# Patient Record
Sex: FEMALE | Race: WHITE | NOT HISPANIC OR LATINO | Employment: UNEMPLOYED | URBAN - METROPOLITAN AREA
[De-identification: names, ages, dates, MRNs, and addresses within clinical notes are randomized per-mention and may not be internally consistent; named-entity substitution may affect disease eponyms.]

---

## 2017-10-16 ENCOUNTER — TRANSCRIBE ORDERS (OUTPATIENT)
Dept: ADMINISTRATIVE | Facility: HOSPITAL | Age: 42
End: 2017-10-16

## 2017-10-16 DIAGNOSIS — Z12.31 VISIT FOR SCREENING MAMMOGRAM: Primary | ICD-10-CM

## 2017-10-18 ENCOUNTER — HOSPITAL ENCOUNTER (OUTPATIENT)
Dept: MAMMOGRAPHY | Facility: HOSPITAL | Age: 42
Discharge: HOME | End: 2017-10-18
Attending: OBSTETRICS & GYNECOLOGY | Admitting: OBSTETRICS & GYNECOLOGY

## 2017-10-18 ENCOUNTER — APPOINTMENT (OUTPATIENT)
Dept: OTHER | Facility: HOSPITAL | Age: 42
End: 2017-10-18
Attending: OBSTETRICS & GYNECOLOGY

## 2017-10-18 DIAGNOSIS — Z12.31 VISIT FOR SCREENING MAMMOGRAM: ICD-10-CM

## 2017-10-18 DIAGNOSIS — Z92.89 H/O MAMMOGRAM: ICD-10-CM

## 2017-10-18 PROCEDURE — 77063 BREAST TOMOSYNTHESIS BI: CPT

## 2017-10-18 PROCEDURE — G0202 SCR MAMMO BI INCL CAD: HCPCS

## 2017-10-24 PROCEDURE — 77063 BREAST TOMOSYNTHESIS BI: CPT | Performed by: RADIOLOGY

## 2017-10-24 PROCEDURE — 77067 SCR MAMMO BI INCL CAD: CPT | Performed by: RADIOLOGY

## 2017-11-03 ENCOUNTER — TRANSCRIBE ORDERS (OUTPATIENT)
Dept: ADMINISTRATIVE | Facility: HOSPITAL | Age: 42
End: 2017-11-03

## 2017-11-03 DIAGNOSIS — N63.0 LUMP OR MASS IN BREAST: Primary | ICD-10-CM

## 2017-11-09 ENCOUNTER — HOSPITAL ENCOUNTER (OUTPATIENT)
Dept: ULTRASOUND IMAGING | Facility: HOSPITAL | Age: 42
Discharge: HOME | End: 2017-11-09
Attending: OBSTETRICS & GYNECOLOGY | Admitting: OBSTETRICS & GYNECOLOGY

## 2017-11-09 DIAGNOSIS — N63.0 LUMP OR MASS IN BREAST: ICD-10-CM

## 2017-11-09 PROCEDURE — 76642 ULTRASOUND BREAST LIMITED: CPT

## 2017-11-09 PROCEDURE — 76642 ULTRASOUND BREAST LIMITED: CPT | Performed by: RADIOLOGY

## 2018-07-02 ENCOUNTER — CONSULT (OUTPATIENT)
Dept: ONCOLOGY | Facility: CLINIC | Age: 43
End: 2018-07-02

## 2018-07-02 ENCOUNTER — LAB (OUTPATIENT)
Dept: LAB | Facility: HOSPITAL | Age: 43
End: 2018-07-02

## 2018-07-02 VITALS
BODY MASS INDEX: 24.44 KG/M2 | WEIGHT: 165 LBS | RESPIRATION RATE: 13 BRPM | HEART RATE: 84 BPM | TEMPERATURE: 98.7 F | SYSTOLIC BLOOD PRESSURE: 133 MMHG | HEIGHT: 69 IN | DIASTOLIC BLOOD PRESSURE: 80 MMHG

## 2018-07-02 DIAGNOSIS — D68.00 VON WILLEBRAND DISEASE (HCC): Primary | ICD-10-CM

## 2018-07-02 DIAGNOSIS — D68.00 VON WILLEBRAND DISEASE (HCC): ICD-10-CM

## 2018-07-02 LAB
ALBUMIN SERPL-MCNC: 4.23 G/DL (ref 3.2–4.8)
ALBUMIN/GLOB SERPL: 1.2 G/DL (ref 1.5–2.5)
ALP SERPL-CCNC: 76 U/L (ref 25–100)
ALT SERPL W P-5'-P-CCNC: 12 U/L (ref 7–40)
ANION GAP SERPL CALCULATED.3IONS-SCNC: 7 MMOL/L (ref 3–11)
APTT PPP: 35.3 SECONDS (ref 24–31)
AST SERPL-CCNC: 17 U/L (ref 0–33)
BILIRUB SERPL-MCNC: 0.5 MG/DL (ref 0.3–1.2)
BUN BLD-MCNC: 18 MG/DL (ref 9–23)
BUN/CREAT SERPL: 23.1 (ref 7–25)
CALCIUM SPEC-SCNC: 8.9 MG/DL (ref 8.7–10.4)
CHLORIDE SERPL-SCNC: 104 MMOL/L (ref 99–109)
CLOSURE TME COLL+ADP BLD: 110 SECONDS (ref 54–123)
CLOSURE TME COLL+EPINEP BLD: 159 SECONDS (ref 72–192)
CO2 SERPL-SCNC: 27 MMOL/L (ref 20–31)
CREAT BLD-MCNC: 0.78 MG/DL (ref 0.6–1.3)
ERYTHROCYTE [DISTWIDTH] IN BLOOD BY AUTOMATED COUNT: 13.8 % (ref 11.3–14.5)
GFR SERPL CREATININE-BSD FRML MDRD: 81 ML/MIN/1.73
GLOBULIN UR ELPH-MCNC: 3.6 GM/DL
GLUCOSE BLD-MCNC: 107 MG/DL (ref 70–100)
HCT VFR BLD AUTO: 40.3 % (ref 34.5–44)
HGB BLD-MCNC: 12.8 G/DL (ref 11.5–15.5)
INR PPP: 1.02 (ref 0.91–1.09)
LYMPHOCYTES # BLD AUTO: 1.6 10*3/MM3 (ref 0.6–4.8)
LYMPHOCYTES NFR BLD AUTO: 22.6 % (ref 24–44)
MCH RBC QN AUTO: 28.7 PG (ref 27–31)
MCHC RBC AUTO-ENTMCNC: 31.9 G/DL (ref 32–36)
MCV RBC AUTO: 90 FL (ref 80–99)
MONOCYTES # BLD AUTO: 0.6 10*3/MM3 (ref 0–1)
MONOCYTES NFR BLD AUTO: 8.5 % (ref 0–12)
NEUTROPHILS # BLD AUTO: 4.8 10*3/MM3 (ref 1.5–8.3)
NEUTROPHILS NFR BLD AUTO: 68.9 % (ref 41–71)
PLATELET # BLD AUTO: 226 10*3/MM3 (ref 150–450)
PMV BLD AUTO: 8.5 FL (ref 6–12)
POTASSIUM BLD-SCNC: 4 MMOL/L (ref 3.5–5.5)
PROT SERPL-MCNC: 7.8 G/DL (ref 5.7–8.2)
PROTHROMBIN TIME: 10.7 SECONDS (ref 9.6–11.5)
RBC # BLD AUTO: 4.47 10*6/MM3 (ref 3.89–5.14)
SODIUM BLD-SCNC: 138 MMOL/L (ref 132–146)
WBC NRBC COR # BLD: 7 10*3/MM3 (ref 3.5–10.8)

## 2018-07-02 PROCEDURE — 85730 THROMBOPLASTIN TIME PARTIAL: CPT

## 2018-07-02 PROCEDURE — 36415 COLL VENOUS BLD VENIPUNCTURE: CPT

## 2018-07-02 PROCEDURE — 85576 BLOOD PLATELET AGGREGATION: CPT

## 2018-07-02 PROCEDURE — 85025 COMPLETE CBC W/AUTO DIFF WBC: CPT

## 2018-07-02 PROCEDURE — 85610 PROTHROMBIN TIME: CPT

## 2018-07-02 PROCEDURE — 80053 COMPREHEN METABOLIC PANEL: CPT

## 2018-07-02 PROCEDURE — 99204 OFFICE O/P NEW MOD 45 MIN: CPT | Performed by: INTERNAL MEDICINE

## 2018-07-02 RX ORDER — SPIRONOLACTONE 25 MG/1
75 TABLET ORAL DAILY
Refills: 2 | COMMUNITY
Start: 2018-06-13 | End: 2019-01-02

## 2018-07-02 RX ORDER — TRANEXAMIC ACID 650 MG/1
TABLET ORAL
Refills: 11 | COMMUNITY
Start: 2018-05-21 | End: 2019-01-02

## 2018-07-02 NOTE — PROGRESS NOTES
CHIEF COMPLAINT: History of von Willebrand's disease    REASON FOR REFERRAL: History of von Willebrand's disease      RECORDS OBTAINED  Records of the patients history including those obtained from  the patient were reviewed and summarized in detail.    HISTORY OF PRESENT ILLNESS:  The patient is a 43 y.o.  female, referred for history of von Willebrand's disease.  Sometime in her childhood she was tested and told she had von Willebrand's disease but she lived virtually all her life without any bleeding events and no significant problems with bleeding after trauma.  However, before wisdom tooth extraction and subsequently with colonoscopy and with breast biopsies in [place] she was given Stimate because of what ever was found on that testing.  Subsequently she has delivered 2 children without bleeding problems and had a normal bleeding time and factor VIII activity at that junction apparently.  She has had no easy bruising or bleeding or other issues but is in need of varicose vein stripping and before proceeding with that, Dr. Gonsalves wants a von Willebrand's figured out.    REVIEW OF SYSTEMS:  A 14 point review of systems was performed and is negative except as noted above.    History reviewed. No pertinent past medical history.  Past Surgical History:   Procedure Laterality Date   • BREAST BIOPSY Right 2005   • BREAST EXCISIONAL BIOPSY Right 2007   • HERNIA REPAIR         No current outpatient prescriptions on file prior to visit.     No current facility-administered medications on file prior to visit.        No Known Allergies    Social History     Social History   • Marital status:      Social History Main Topics   • Smoking status: Never Smoker   • Smokeless tobacco: Never Used   • Alcohol use No   • Drug use: No     Other Topics Concern   • Not on file       Family History   Problem Relation Age of Onset   • Breast cancer Neg Hx    • Ovarian cancer Neg Hx        PHYSICAL EXAM:    /80   Pulse  "84   Temp 98.7 °F (37.1 °C) (Temporal Artery )   Resp 13   Ht 175.3 cm (69\")   Wt 74.8 kg (165 lb)   BMI 24.37 kg/m²     ECOG: (0) Fully active, able to carry on all predisease performance without restriction  General: well appearing female in no acute distress  HEENT: sclera anicteric, oropharynx clear  Lymphatics: no cervical, supraclavicular, inguinal, or axillary adenopathy  Cardiovascular: regular rate and rhythm, no murmurs  Neck: Supple; No thyromegaly  Lungs: clear to auscultation bilaterally. No respiratory distress.   Abdomen: soft, nontender, nondistended.  No palpable organomegaly  Extremities: no cyanosis, clubbing, edema, or cords  Skin: no rashes, lesions, bruising, or petechiae  Neuro: Alert and oriented x 4; Moving all extremities.  Psych: No anxiety or depression    No visits with results within 6 Week(s) from this visit.   Latest known visit with results is:   Hospital Outpatient Visit on 05/03/2016   Component Date Value Ref Range Status   • C-Reactive Protein 05/03/2016 <0.1  0.000 - 10.000 mg/L Final   • Tissue Transglutaminase IgA 05/03/2016 <2  0 - 3 U/mL Final    Comment:                               Negative        0 -  3                                Weak Positive   4 - 10                                Positive           >10   Tissue Transglutaminase (tTG) has been identified   as the endomysial antigen.  Studies have demonstr-   ated that endomysial IgA antibodies have over 99%   specificity for gluten sensitive enteropathy.         Assessment/Plan     1. Reported history of von Willebrand's disease without significant bleeding events at any time in her life that I can detect  2. Varicose veins with potential stripping scheduled for the end of August 2018    Discussion: she is going to try to get information from her prior hematologist evaluation in the mid 2000s in [place].  In the meantime I'm going to check her platelet function assay which has replaced the bleeding time and " is a much better assay of platelet function.  We will check her PT and PTT.  If all these screening tests are negative, the odds of there being significant von Willebrand's disease is highly unlikely but I will wait and see what medical records we can find to guide us.  She is not sure why she was tested for this in childhood as she never had bleeding problems but does have a recollection this actually came down from her father based on testing he has done though he did not have major bleeding problems either apparently.  I discussed with patient 45 minutes greater than 50% spent counseling.      Lloyd Oneal MD    7/2/2018

## 2018-08-02 ENCOUNTER — OFFICE VISIT (OUTPATIENT)
Dept: ONCOLOGY | Facility: CLINIC | Age: 43
End: 2018-08-02

## 2018-08-02 VITALS
WEIGHT: 167 LBS | HEIGHT: 69 IN | HEART RATE: 69 BPM | RESPIRATION RATE: 14 BRPM | TEMPERATURE: 98 F | BODY MASS INDEX: 24.73 KG/M2 | DIASTOLIC BLOOD PRESSURE: 82 MMHG | SYSTOLIC BLOOD PRESSURE: 137 MMHG

## 2018-08-02 DIAGNOSIS — D68.00 VON WILLEBRAND DISEASE (HCC): Primary | ICD-10-CM

## 2018-08-02 PROCEDURE — 99214 OFFICE O/P EST MOD 30 MIN: CPT | Performed by: INTERNAL MEDICINE

## 2018-08-02 RX ORDER — AZELAIC ACID 0.15 G/G
AEROSOL, FOAM TOPICAL
Refills: 6 | COMMUNITY
Start: 2018-06-13 | End: 2019-01-02

## 2018-08-02 NOTE — PROGRESS NOTES
CHIEF COMPLAINT: Management of von Willebrand's disease     Problem List:     Von Willebrand disease (CMS/HCC)    7/2/2018 Initial Diagnosis     Von Willebrand disease (CMS/[facility]):  referred for history of von Willebrand's disease.  Sometime in her childhood she was tested and told she had von Willebrand's disease but she lived virtually all her life without any bleeding events and no significant problems with bleeding after trauma.  However, before wisdom tooth extraction and subsequently with colonoscopy and with breast biopsies in [place] she was given Stimate because of what ever was found on that testing.  Subsequently she has delivered 2 children without bleeding problems and had a normal bleeding time and factor VIII activity at that junction apparently.  She has had no easy bruising or bleeding or other issues but is in need of varicose vein stripping and before proceeding with that, Dr. Gonsalves wants her von Willebrand's sorted out.  Patient brings records that shows a PTT historically of 62 with a factor XII low at 37%, factor VIII procoagulant of 35% lower limit of normal 50%, factor VIII antigen low at 40% lower limit of normal 70%, platelet ristocetin cofactor of 44%, and platelet collagen aggregation of 18 lower limit of normal 100.  She also brings records from [facility] August 1994 that showed a DDAVP challenge showing factor VIII concentration of 65% increased to 271% one hour post and 168% 3 hours post.  Ristocetin cofactor 31% up to 103% one-hour post and 85% 3 hours post, and a factor VIII antigen of 33% up to 1 hour post 200% and 116% at 3 hours post showing a good response to DDAVP.  According to Dr. Gaines at the 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  Dr. Gaines recommended Lisa received fresh frozen plasma and platelet transfusions both prior to any type of major surgery.              HISTORY OF PRESENT ILLNESS:  The patient is a 43 y.o. female, here for follow up on management of von Willebrand's disease.  She brings to me her medical records dating back to 1994 including blood work done at [facility] as outlined above.      History reviewed. No pertinent past medical history.  Past Surgical History:   Procedure Laterality Date   • BREAST BIOPSY Right 2005   • BREAST EXCISIONAL BIOPSY Right 2007   • HERNIA REPAIR         No Known Allergies    Family History and Social History reviewed and changed as necessary      REVIEW OF SYSTEM:   Review of Systems   Constitutional: Negative for appetite change, chills, diaphoresis, fatigue, fever and unexpected weight change.   HENT:   Negative for mouth sores, sore throat and trouble swallowing.    Eyes: Negative for icterus.   Respiratory: Negative for cough, hemoptysis and shortness of breath.    Cardiovascular: Negative for chest pain, leg swelling and palpitations.   Gastrointestinal: Negative for abdominal distention, abdominal pain, blood in stool, constipation, diarrhea, nausea and vomiting.   Endocrine: Negative for hot flashes.   Genitourinary: Negative for bladder incontinence, difficulty urinating, dysuria, frequency and hematuria.    Musculoskeletal: Negative for gait problem, neck pain and neck stiffness.   Skin: Negative for rash.   Neurological: Negative for dizziness, gait problem, headaches, light-headedness and numbness.   Hematological: Negative for adenopathy. Does not bruise/bleed easily.   Psychiatric/Behavioral: Negative for depression. The patient is not nervous/anxious.    All other systems reviewed and are negative.       PHYSICAL  "EXAM    Vitals:    08/02/18 0756   BP: 137/82   Pulse: 69   Resp: 14   Temp: 98 °F (36.7 °C)   Weight: 75.8 kg (167 lb)   Height: 175.3 cm (69\")     Constitutional: Appears well-developed and well-nourished. No distress.   ECOG: (0) Fully active, able to carry on all predisease performance without restriction  HENT:   Head: Normocephalic.   Mouth/Throat: Oropharynx is clear and moist.   Eyes: Conjunctivae are normal. Pupils are equal, round, and reactive to light. No scleral icterus.   Neck: Neck supple. No JVD present. No thyromegaly present.   Cardiovascular: Normal rate, regular rhythm and normal heart sounds.    Pulmonary/Chest: Breath sounds normal. No respiratory distress.   Abdominal: Soft. Exhibits no distension and no mass. There is no hepatosplenomegaly. There is no tenderness. There is no rebound and no guarding.   Musculoskeletal:Exhibits no edema, tenderness or deformity.   Neurological: Alert and oriented to person, place, and time. Exhibits normal muscle tone.   Skin: No ecchymosis, no petechiae and no rash noted. Not diaphoretic. No cyanosis. Nails show no clubbing.   Psychiatric: Normal mood and affect.   Vitals reviewed.      No radiology results for the last 7 days          ASSESSMENT & PLAN:    1.  Mild von Willebrand's disease  2. Factor XII deficiency  3. Platelet storage pool defect    Discussion: though she has been through some trauma without major issues, for an elective procedure such as vein stripping where there is risk of thrombosis and with factor XII deficiency which actually can increase the risk of thrombosis as well as bleeding, I would not go through this elective procedure.  Per Dr. Gaines, the recommendation would be to give her fresh frozen plasma and platelets both before any major surgeries.  Previously she been treated with Humate-P but she also responds to DDAVP but in the setting with multiple defects I think it is quite reasonable to follow Dr. Gaines's recommendation.  For " now she is in no need of surgery and is willing to forego the vein stripping.  I have spoken with Dr. Kevin Gonsalves.  I will see her on an as-needed basis.  Discussed with patient 30 minutes greater than 50% spent counseling      Lloyd Oneal MD    08/02/2018

## 2018-09-24 ENCOUNTER — TRANSCRIBE ORDERS (OUTPATIENT)
Dept: ADMINISTRATIVE | Facility: HOSPITAL | Age: 43
End: 2018-09-24

## 2018-09-24 DIAGNOSIS — Z12.31 VISIT FOR SCREENING MAMMOGRAM: Primary | ICD-10-CM

## 2018-10-19 ENCOUNTER — HOSPITAL ENCOUNTER (OUTPATIENT)
Dept: MAMMOGRAPHY | Facility: HOSPITAL | Age: 43
Discharge: HOME | End: 2018-10-19
Attending: OBSTETRICS & GYNECOLOGY | Admitting: OBSTETRICS & GYNECOLOGY

## 2018-10-19 DIAGNOSIS — Z12.31 VISIT FOR SCREENING MAMMOGRAM: ICD-10-CM

## 2018-10-19 PROCEDURE — 77067 SCR MAMMO BI INCL CAD: CPT

## 2018-10-19 PROCEDURE — 77067 SCR MAMMO BI INCL CAD: CPT | Performed by: RADIOLOGY

## 2018-10-19 PROCEDURE — 77063 BREAST TOMOSYNTHESIS BI: CPT

## 2018-10-19 PROCEDURE — 77063 BREAST TOMOSYNTHESIS BI: CPT | Performed by: RADIOLOGY

## 2019-01-02 ENCOUNTER — APPOINTMENT (OUTPATIENT)
Dept: PREADMISSION TESTING | Facility: HOSPITAL | Age: 44
End: 2019-01-02

## 2019-01-02 VITALS — BODY MASS INDEX: 25.22 KG/M2 | WEIGHT: 166.4 LBS | HEIGHT: 68 IN

## 2019-01-02 LAB
APTT PPP: 34.8 SECONDS (ref 24–31)
DEPRECATED RDW RBC AUTO: 42.7 FL (ref 37–54)
ERYTHROCYTE [DISTWIDTH] IN BLOOD BY AUTOMATED COUNT: 12.7 % (ref 11.3–14.5)
HCT VFR BLD AUTO: 41.8 % (ref 34.5–44)
HGB BLD-MCNC: 13.7 G/DL (ref 11.5–15.5)
INR PPP: 0.97 (ref 0.91–1.09)
MCH RBC QN AUTO: 30.1 PG (ref 27–31)
MCHC RBC AUTO-ENTMCNC: 32.8 G/DL (ref 32–36)
MCV RBC AUTO: 91.9 FL (ref 80–99)
PLATELET # BLD AUTO: 252 10*3/MM3 (ref 150–450)
PMV BLD AUTO: 11.1 FL (ref 6–12)
PROTHROMBIN TIME: 10.2 SECONDS (ref 9.6–11.5)
RBC # BLD AUTO: 4.55 10*6/MM3 (ref 3.89–5.14)
WBC NRBC COR # BLD: 6.43 10*3/MM3 (ref 3.5–10.8)

## 2019-01-02 PROCEDURE — 85730 THROMBOPLASTIN TIME PARTIAL: CPT | Performed by: OTOLARYNGOLOGY

## 2019-01-02 PROCEDURE — 36415 COLL VENOUS BLD VENIPUNCTURE: CPT

## 2019-01-02 PROCEDURE — 85610 PROTHROMBIN TIME: CPT | Performed by: OTOLARYNGOLOGY

## 2019-01-02 PROCEDURE — 85027 COMPLETE CBC AUTOMATED: CPT | Performed by: OTOLARYNGOLOGY

## 2019-01-02 NOTE — DISCHARGE INSTRUCTIONS

## 2019-01-02 NOTE — PAT
Patient has Von Willebrand disease as well as Factor 12.  Patient is followed closely by Dr Oneal.  Per patient she will need fresh frozen plasma in preop.  No orders on PAT regarding this.  Patient going to the office to talk to Niki the , thus I told her to mention to the  that orders for FFP need to be faxed to preop in order for them to administer them on the day of surgery.

## 2019-01-08 ENCOUNTER — ANESTHESIA (OUTPATIENT)
Dept: PERIOP | Facility: HOSPITAL | Age: 44
End: 2019-01-08

## 2019-01-08 ENCOUNTER — HOSPITAL ENCOUNTER (OUTPATIENT)
Facility: HOSPITAL | Age: 44
Setting detail: HOSPITAL OUTPATIENT SURGERY
Discharge: HOME | End: 2019-01-08
Attending: OTOLARYNGOLOGY | Admitting: OTOLARYNGOLOGY

## 2019-01-08 ENCOUNTER — ANESTHESIA EVENT (OUTPATIENT)
Dept: PERIOP | Facility: HOSPITAL | Age: 44
End: 2019-01-08

## 2019-01-08 VITALS
HEART RATE: 66 BPM | TEMPERATURE: 97.3 F | OXYGEN SATURATION: 98 % | DIASTOLIC BLOOD PRESSURE: 79 MMHG | RESPIRATION RATE: 19 BRPM | SYSTOLIC BLOOD PRESSURE: 135 MMHG

## 2019-01-08 DIAGNOSIS — J32.1 SINUSITIS CHRONIC, FRONTAL: ICD-10-CM

## 2019-01-08 LAB
ABO GROUP BLD: NORMAL
B-HCG UR QL: NEGATIVE
BLD GP AB SCN SERPL QL: NEGATIVE
INTERNAL NEGATIVE CONTROL: NEGATIVE
INTERNAL POSITIVE CONTROL: POSITIVE
Lab: NORMAL
RH BLD: POSITIVE
T&S EXPIRATION DATE: NORMAL

## 2019-01-08 PROCEDURE — 87075 CULTR BACTERIA EXCEPT BLOOD: CPT | Performed by: OTOLARYNGOLOGY

## 2019-01-08 PROCEDURE — 86900 BLOOD TYPING SEROLOGIC ABO: CPT | Performed by: OTOLARYNGOLOGY

## 2019-01-08 PROCEDURE — P9017 PLASMA 1 DONOR FRZ W/IN 8 HR: HCPCS

## 2019-01-08 PROCEDURE — 25010000002 ONDANSETRON PER 1 MG: Performed by: NURSE ANESTHETIST, CERTIFIED REGISTERED

## 2019-01-08 PROCEDURE — 87147 CULTURE TYPE IMMUNOLOGIC: CPT | Performed by: OTOLARYNGOLOGY

## 2019-01-08 PROCEDURE — 87205 SMEAR GRAM STAIN: CPT | Performed by: OTOLARYNGOLOGY

## 2019-01-08 PROCEDURE — 25010000002 DEXAMETHASONE PER 1 MG: Performed by: NURSE ANESTHETIST, CERTIFIED REGISTERED

## 2019-01-08 PROCEDURE — 25010000002 NEOSTIGMINE 10 MG/10ML SOLUTION: Performed by: NURSE ANESTHETIST, CERTIFIED REGISTERED

## 2019-01-08 PROCEDURE — 63710000001 PROMETHAZINE PER 25 MG: Performed by: OTOLARYNGOLOGY

## 2019-01-08 PROCEDURE — 86927 PLASMA FRESH FROZEN: CPT

## 2019-01-08 PROCEDURE — 87186 SC STD MICRODIL/AGAR DIL: CPT | Performed by: OTOLARYNGOLOGY

## 2019-01-08 PROCEDURE — 87070 CULTURE OTHR SPECIMN AEROBIC: CPT | Performed by: OTOLARYNGOLOGY

## 2019-01-08 PROCEDURE — 81025 URINE PREGNANCY TEST: CPT | Performed by: OTOLARYNGOLOGY

## 2019-01-08 PROCEDURE — 25010000002 PROPOFOL 10 MG/ML EMULSION: Performed by: NURSE ANESTHETIST, CERTIFIED REGISTERED

## 2019-01-08 PROCEDURE — 87176 TISSUE HOMOGENIZATION CULTR: CPT | Performed by: OTOLARYNGOLOGY

## 2019-01-08 PROCEDURE — 86850 RBC ANTIBODY SCREEN: CPT | Performed by: OTOLARYNGOLOGY

## 2019-01-08 PROCEDURE — 36430 TRANSFUSION BLD/BLD COMPNT: CPT

## 2019-01-08 PROCEDURE — 88305 TISSUE EXAM BY PATHOLOGIST: CPT | Performed by: OTOLARYNGOLOGY

## 2019-01-08 PROCEDURE — 25010000002 FENTANYL CITRATE (PF) 100 MCG/2ML SOLUTION: Performed by: NURSE ANESTHETIST, CERTIFIED REGISTERED

## 2019-01-08 PROCEDURE — 86901 BLOOD TYPING SEROLOGIC RH(D): CPT | Performed by: OTOLARYNGOLOGY

## 2019-01-08 PROCEDURE — 87076 CULTURE ANAEROBE IDENT EACH: CPT | Performed by: OTOLARYNGOLOGY

## 2019-01-08 PROCEDURE — P9035 PLATELET PHERES LEUKOREDUCED: HCPCS

## 2019-01-08 PROCEDURE — 25010000003 CEFAZOLIN IN DEXTROSE 2-4 GM/100ML-% SOLUTION: Performed by: NURSE ANESTHETIST, CERTIFIED REGISTERED

## 2019-01-08 PROCEDURE — 87077 CULTURE AEROBIC IDENTIFY: CPT | Performed by: OTOLARYNGOLOGY

## 2019-01-08 RX ORDER — LIDOCAINE HYDROCHLORIDE 10 MG/ML
INJECTION, SOLUTION EPIDURAL; INFILTRATION; INTRACAUDAL; PERINEURAL AS NEEDED
Status: DISCONTINUED | OUTPATIENT
Start: 2019-01-08 | End: 2019-01-08 | Stop reason: SURG

## 2019-01-08 RX ORDER — LIDOCAINE HYDROCHLORIDE AND EPINEPHRINE 10; 10 MG/ML; UG/ML
INJECTION, SOLUTION INFILTRATION; PERINEURAL AS NEEDED
Status: DISCONTINUED | OUTPATIENT
Start: 2019-01-08 | End: 2019-01-08 | Stop reason: HOSPADM

## 2019-01-08 RX ORDER — FAMOTIDINE 20 MG/1
20 TABLET, FILM COATED ORAL ONCE
Status: DISCONTINUED | OUTPATIENT
Start: 2019-01-08 | End: 2019-01-08 | Stop reason: HOSPADM

## 2019-01-08 RX ORDER — FAMOTIDINE 10 MG/ML
20 INJECTION, SOLUTION INTRAVENOUS ONCE
Status: DISCONTINUED | OUTPATIENT
Start: 2019-01-08 | End: 2019-01-08 | Stop reason: HOSPADM

## 2019-01-08 RX ORDER — FENTANYL CITRATE 50 UG/ML
INJECTION, SOLUTION INTRAMUSCULAR; INTRAVENOUS AS NEEDED
Status: DISCONTINUED | OUTPATIENT
Start: 2019-01-08 | End: 2019-01-08 | Stop reason: SURG

## 2019-01-08 RX ORDER — FENTANYL CITRATE 50 UG/ML
50 INJECTION, SOLUTION INTRAMUSCULAR; INTRAVENOUS
Status: DISCONTINUED | OUTPATIENT
Start: 2019-01-08 | End: 2019-01-08 | Stop reason: HOSPADM

## 2019-01-08 RX ORDER — SODIUM CHLORIDE 0.9 % (FLUSH) 0.9 %
3-10 SYRINGE (ML) INJECTION AS NEEDED
Status: DISCONTINUED | OUTPATIENT
Start: 2019-01-08 | End: 2019-01-08 | Stop reason: HOSPADM

## 2019-01-08 RX ORDER — LIDOCAINE HYDROCHLORIDE 10 MG/ML
0.5 INJECTION, SOLUTION EPIDURAL; INFILTRATION; INTRACAUDAL; PERINEURAL ONCE AS NEEDED
Status: DISCONTINUED | OUTPATIENT
Start: 2019-01-08 | End: 2019-01-08 | Stop reason: HOSPADM

## 2019-01-08 RX ORDER — ATRACURIUM BESYLATE 10 MG/ML
INJECTION, SOLUTION INTRAVENOUS AS NEEDED
Status: DISCONTINUED | OUTPATIENT
Start: 2019-01-08 | End: 2019-01-08 | Stop reason: SURG

## 2019-01-08 RX ORDER — LIDOCAINE HYDROCHLORIDE 10 MG/ML
0.5 INJECTION, SOLUTION EPIDURAL; INFILTRATION; INTRACAUDAL; PERINEURAL ONCE AS NEEDED
Status: COMPLETED | OUTPATIENT
Start: 2019-01-08 | End: 2019-01-08

## 2019-01-08 RX ORDER — OXYMETAZOLINE HYDROCHLORIDE 0.05 G/100ML
SPRAY NASAL AS NEEDED
Status: DISCONTINUED | OUTPATIENT
Start: 2019-01-08 | End: 2019-01-08 | Stop reason: HOSPADM

## 2019-01-08 RX ORDER — DEXAMETHASONE SODIUM PHOSPHATE 4 MG/ML
INJECTION, SOLUTION INTRA-ARTICULAR; INTRALESIONAL; INTRAMUSCULAR; INTRAVENOUS; SOFT TISSUE AS NEEDED
Status: DISCONTINUED | OUTPATIENT
Start: 2019-01-08 | End: 2019-01-08 | Stop reason: SURG

## 2019-01-08 RX ORDER — SODIUM CHLORIDE, SODIUM LACTATE, POTASSIUM CHLORIDE, CALCIUM CHLORIDE 600; 310; 30; 20 MG/100ML; MG/100ML; MG/100ML; MG/100ML
9 INJECTION, SOLUTION INTRAVENOUS CONTINUOUS PRN
Status: DISCONTINUED | OUTPATIENT
Start: 2019-01-08 | End: 2019-01-08 | Stop reason: HOSPADM

## 2019-01-08 RX ORDER — ROCURONIUM BROMIDE 10 MG/ML
INJECTION, SOLUTION INTRAVENOUS AS NEEDED
Status: DISCONTINUED | OUTPATIENT
Start: 2019-01-08 | End: 2019-01-08 | Stop reason: SURG

## 2019-01-08 RX ORDER — NEOSTIGMINE METHYLSULFATE 1 MG/ML
INJECTION, SOLUTION INTRAVENOUS AS NEEDED
Status: DISCONTINUED | OUTPATIENT
Start: 2019-01-08 | End: 2019-01-08 | Stop reason: SURG

## 2019-01-08 RX ORDER — FAMOTIDINE 20 MG/1
20 TABLET, FILM COATED ORAL
Status: DISCONTINUED | OUTPATIENT
Start: 2019-01-08 | End: 2019-01-08 | Stop reason: HOSPADM

## 2019-01-08 RX ORDER — SODIUM CHLORIDE, SODIUM LACTATE, POTASSIUM CHLORIDE, CALCIUM CHLORIDE 600; 310; 30; 20 MG/100ML; MG/100ML; MG/100ML; MG/100ML
9 INJECTION, SOLUTION INTRAVENOUS CONTINUOUS
Status: DISCONTINUED | OUTPATIENT
Start: 2019-01-08 | End: 2019-01-08 | Stop reason: HOSPADM

## 2019-01-08 RX ORDER — PROPOFOL 10 MG/ML
VIAL (ML) INTRAVENOUS AS NEEDED
Status: DISCONTINUED | OUTPATIENT
Start: 2019-01-08 | End: 2019-01-08 | Stop reason: SURG

## 2019-01-08 RX ORDER — CEFAZOLIN SODIUM 2 G/100ML
INJECTION, SOLUTION INTRAVENOUS AS NEEDED
Status: DISCONTINUED | OUTPATIENT
Start: 2019-01-08 | End: 2019-01-08 | Stop reason: SURG

## 2019-01-08 RX ORDER — SODIUM CHLORIDE 0.9 % (FLUSH) 0.9 %
3 SYRINGE (ML) INJECTION EVERY 12 HOURS SCHEDULED
Status: DISCONTINUED | OUTPATIENT
Start: 2019-01-08 | End: 2019-01-08 | Stop reason: HOSPADM

## 2019-01-08 RX ORDER — SODIUM CHLORIDE 0.9 % (FLUSH) 0.9 %
1-10 SYRINGE (ML) INJECTION AS NEEDED
Status: DISCONTINUED | OUTPATIENT
Start: 2019-01-08 | End: 2019-01-08 | Stop reason: HOSPADM

## 2019-01-08 RX ORDER — ONDANSETRON 2 MG/ML
4 INJECTION INTRAMUSCULAR; INTRAVENOUS ONCE AS NEEDED
Status: COMPLETED | OUTPATIENT
Start: 2019-01-08 | End: 2019-01-08

## 2019-01-08 RX ORDER — PROPOFOL 10 MG/ML
VIAL (ML) INTRAVENOUS CONTINUOUS PRN
Status: DISCONTINUED | OUTPATIENT
Start: 2019-01-08 | End: 2019-01-08 | Stop reason: SURG

## 2019-01-08 RX ORDER — PROMETHAZINE HYDROCHLORIDE 25 MG/1
25 TABLET ORAL ONCE
Status: COMPLETED | OUTPATIENT
Start: 2019-01-08 | End: 2019-01-08

## 2019-01-08 RX ORDER — GLYCOPYRROLATE 0.2 MG/ML
INJECTION INTRAMUSCULAR; INTRAVENOUS AS NEEDED
Status: DISCONTINUED | OUTPATIENT
Start: 2019-01-08 | End: 2019-01-08 | Stop reason: SURG

## 2019-01-08 RX ORDER — MAGNESIUM HYDROXIDE 1200 MG/15ML
LIQUID ORAL AS NEEDED
Status: DISCONTINUED | OUTPATIENT
Start: 2019-01-08 | End: 2019-01-08 | Stop reason: HOSPADM

## 2019-01-08 RX ORDER — ONDANSETRON 2 MG/ML
INJECTION INTRAMUSCULAR; INTRAVENOUS AS NEEDED
Status: DISCONTINUED | OUTPATIENT
Start: 2019-01-08 | End: 2019-01-08 | Stop reason: SURG

## 2019-01-08 RX ORDER — SODIUM CHLORIDE, SODIUM LACTATE, POTASSIUM CHLORIDE, CALCIUM CHLORIDE 600; 310; 30; 20 MG/100ML; MG/100ML; MG/100ML; MG/100ML
INJECTION, SOLUTION INTRAVENOUS CONTINUOUS PRN
Status: DISCONTINUED | OUTPATIENT
Start: 2019-01-08 | End: 2019-01-08 | Stop reason: SURG

## 2019-01-08 RX ORDER — SCOLOPAMINE TRANSDERMAL SYSTEM 1 MG/1
1 PATCH, EXTENDED RELEASE TRANSDERMAL ONCE
Status: DISCONTINUED | OUTPATIENT
Start: 2019-01-08 | End: 2019-01-08 | Stop reason: HOSPADM

## 2019-01-08 RX ADMIN — FAMOTIDINE 20 MG: 20 TABLET, FILM COATED ORAL at 12:17

## 2019-01-08 RX ADMIN — DEXAMETHASONE SODIUM PHOSPHATE 8 MG: 4 INJECTION, SOLUTION INTRAMUSCULAR; INTRAVENOUS at 14:29

## 2019-01-08 RX ADMIN — PROPOFOL 150 MG: 10 INJECTION, EMULSION INTRAVENOUS at 14:23

## 2019-01-08 RX ADMIN — FENTANYL CITRATE 50 MCG: 50 INJECTION, SOLUTION INTRAMUSCULAR; INTRAVENOUS at 14:23

## 2019-01-08 RX ADMIN — GLYCOPYRROLATE 0.4 MG: 0.2 INJECTION, SOLUTION INTRAMUSCULAR; INTRAVENOUS at 15:40

## 2019-01-08 RX ADMIN — CEFAZOLIN SODIUM 2 G: 2 INJECTION, SOLUTION INTRAVENOUS at 14:21

## 2019-01-08 RX ADMIN — PROMETHAZINE HYDROCHLORIDE 25 MG: 25 TABLET ORAL at 17:18

## 2019-01-08 RX ADMIN — FENTANYL CITRATE 50 MCG: 50 INJECTION, SOLUTION INTRAMUSCULAR; INTRAVENOUS at 15:17

## 2019-01-08 RX ADMIN — ONDANSETRON 4 MG: 2 INJECTION INTRAMUSCULAR; INTRAVENOUS at 16:46

## 2019-01-08 RX ADMIN — SODIUM CHLORIDE, POTASSIUM CHLORIDE, SODIUM LACTATE AND CALCIUM CHLORIDE: 600; 310; 30; 20 INJECTION, SOLUTION INTRAVENOUS at 14:16

## 2019-01-08 RX ADMIN — SODIUM CHLORIDE, POTASSIUM CHLORIDE, SODIUM LACTATE AND CALCIUM CHLORIDE 9 ML/HR: 600; 310; 30; 20 INJECTION, SOLUTION INTRAVENOUS at 12:00

## 2019-01-08 RX ADMIN — ONDANSETRON 4 MG: 2 INJECTION INTRAMUSCULAR; INTRAVENOUS at 15:40

## 2019-01-08 RX ADMIN — LIDOCAINE HYDROCHLORIDE 0.2 ML: 10 INJECTION, SOLUTION EPIDURAL; INFILTRATION; INTRACAUDAL; PERINEURAL at 12:00

## 2019-01-08 RX ADMIN — SCOPALAMINE 1 PATCH: 1 PATCH, EXTENDED RELEASE TRANSDERMAL at 13:35

## 2019-01-08 RX ADMIN — NEOSTIGMINE METHYLSULFATE 3 MG: 1 INJECTION, SOLUTION INTRAVENOUS at 15:40

## 2019-01-08 RX ADMIN — PROPOFOL 25 MCG/KG/MIN: 10 INJECTION, EMULSION INTRAVENOUS at 14:29

## 2019-01-08 RX ADMIN — ATRACURIUM BESYLATE 20 MG: 10 INJECTION, SOLUTION INTRAVENOUS at 15:05

## 2019-01-08 RX ADMIN — ROCURONIUM BROMIDE 50 MG: 10 SOLUTION INTRAVENOUS at 14:23

## 2019-01-08 RX ADMIN — LIDOCAINE HYDROCHLORIDE 50 MG: 10 INJECTION, SOLUTION EPIDURAL; INFILTRATION; INTRACAUDAL; PERINEURAL at 14:23

## 2019-01-08 NOTE — ANESTHESIA POSTPROCEDURE EVALUATION
Patient: Lisa Martin    Procedure Summary     Date:  01/08/19 Room / Location:   [facility]    Anesthesia Start:  1416 Anesthesia Stop:  1606    Procedure:  BILATERAL TOTAL ETHMOIDECTOMY,BILATERAL FRONTAL SINUPLASTY WITH TISSUE REMOVAL, BILATERAL MAXILLARY SINUPLASTY WITH TISSUE REMOVAL (Bilateral Nose) Diagnosis:      Surgeon:  Jose Camacho MD Provider:  Jacques Ramires MD    Anesthesia Type:  general ASA Status:  2          Anesthesia Type: general  Last vitals  BP   120/72   Temp   97.0   Pulse   75   Resp   14   SpO2   100     Post Anesthesia Care and Evaluation    Patient location during evaluation: PACU  Patient participation: waiting for patient participation  Level of consciousness: sleepy but conscious  Pain score: 0  Pain management: adequate  Airway patency: patent  Anesthetic complications: No anesthetic complications  PONV Status: none  Cardiovascular status: hemodynamically stable and acceptable  Respiratory status: nonlabored ventilation, acceptable, nasal cannula and oral airway  Hydration status: acceptable

## 2019-01-08 NOTE — ANESTHESIA PROCEDURE NOTES
ANESTHESIA INTUBATION  Urgency: elective    Airway not difficult    General Information and Staff    Patient location during procedure: OR  CRNA: Melissa Rodgers CRNA    Indications and Patient Condition  Indications for airway management: airway protection    Preoxygenated: yes  MILS not maintained throughout  Mask difficulty assessment: 1 - vent by mask    Final Airway Details  Final airway type: endotracheal airway      Successful airway: ETT  Cuffed: yes   Successful intubation technique: direct laryngoscopy  Facilitating devices/methods: intubating stylet  Endotracheal tube insertion site: oral  Blade: Silva  Blade size: 2  ETT size (mm): 7.0  Cormack-Lehane Classification: grade I - full view of glottis  Placement verified by: chest auscultation and capnometry   Measured from: lips  ETT to lips (cm): 20  Number of attempts at approach: 1    Additional Comments  Negative epigastric sounds, Breath sound equal bilaterally with symmetric chest rise and fall.  Teeth intact, atraumatic

## 2019-01-08 NOTE — OP NOTE
PREOPERATIVE DIAGNOSES:    1.  Chronic frontal sinusitis.   2.  Chronic ethmoid sinusitis.   3.  Chronic maxillary sinusitis.      POSTOPERATIVE DIAGNOSES:    1.  Chronic frontal sinusitis.   2.  Chronic ethmoid sinusitis.   3.  Chronic maxillary sinusitis.      PROCEDURES PERFORMED:    1.  Bilateral frontal sinusotomy with removal of sinus contents.    2.  Bilateral total ethmoidectomy with removal of sinus contents.    3.  Bilateral maxillary antrostomy with removal of sinus contents.      SURGEON: Jose Camacho MD     INDICATIONS:  Patient is a 43-year-old lady who has had a many-month history of chronic sinusitis that has been refractory to management with medication.  Her CT scan showed involvement of bilateral maxillary, ethmoid and frontal sinuses.  The findings are purulent fluid filling the left maxillary sinus cavity.  She had extensive inflammatory changes including inflammatory polyps involving the ethmoid and frontal sinuses.  She also had thick mucoid fluid in the right maxillary sinus cavity.             ESTIMATED BLOOD LOSS:  30 mL.     COMPLICATIONS:  None.      ANESTHESIA:  General.      DESCRIPTION OF PROCEDURE:  After informed consent was obtained from the patient, she was brought back to the operating room.  The proper operative sites were identified and confirmed with the OR staff.  The patient was placed under general anesthesia and intubated as per the anesthesia team.  She was positioned, prepped and draped in usual sterile fashion.  The zero-degree scope was first inserted on the left side of the nose.  The middle turbinate and uncinate process were injected with 2 mL of 1% lidocaine with epinephrine.  Endoscopic curved scissors were used to remove the anterior-inferior half of the middle turbinate.  A seeker was used to identify the maxillary sinus ostium.  Through-cutting forceps were used to widen the ostium and a Microdebrider was used to take down the uncinate process.  This resulted  in the immediate release of thick purulent fluid from the left maxillary sinus cavity.  Photo documentation was taken.  The purulent fluid was suctioned from the maxillary sinus cavity.  There were also extensive inflammatory polyps which were removed using the curved forceps.  The contents of the left maxillary sinus cavity were also cultured.  After completion of the left maxillary antrostomy with removal of sinus contents, attention was turned toward the left total ethmoidectomy.  Straight-biting forceps were used to take down the ethmoid bulla.  A combination of straight-biting and upbiting forceps as well as the Microdebrider were used to complete the left total ethmoidectomy.  There was extensive polypoid disease in this area which was removed and some of this was sent for pathology.  After completion of the left total ethmoidectomy, attention was turned toward the left frontal sinusotomy.  Upbiting forceps were used to remove extensive polypoid disease from the frontal sinus recess.  There was mucoid fluid which was suctioned from the frontal sinus cavity.  After completion of the left frontal sinusotomy with removal of sinus contents, Afrin-soaked cotton was replaced for hemostasis.                      Attention was then turned toward the right side of the nose.  Again, the zero-degree scope was inserted and the middle turbinate along with the uncinate process were injected with 2 mL of 1% lidocaine with epinephrine. Endoscopic curved scissors were used to remove the anterior-inferior half of the middle turbinate.  A seeker was used to identify the maxillary sinus ostium.  Through-cutting forceps were used to widen the ostium. The Microdebrider was then used to take down the uncinate process.  There was thick mucoid fluid filling the right maxillary sinus cavity which was suctioned clear.  There was also polypoid disease which was removed with the curved forceps.  After completion of the right maxillary  antrostomy with removal of the sinus contents, attention was turned toward the right total ethmoidectomy.  Straight-biting forceps were used to take down the ethmoid bulla.  A combination of straight-biting and upbiting forceps along with the Microdebrider were then used to complete the right total ethmoidectomy.  Some polypoid disease in this area was removed.   After completion of the right total ethmoidectomy, attention was turned toward the right frontal sinusotomy.  Upbiting forceps were used to remove polypoid disease from this area.  There was some thick mucoid fluid that was suctioned from the frontal sinus cavity.  After completion of the right frontal sinusotomy, Afrin-soaked cotton was replaced for hemostasis.      All of the cotton wedges were then removed and the count was correct.  Antibiotic saline rinse was used to irrigate both maxillary sinuses.  Photo documentation was taken. Xerogel absorbable packing was then placed lateral to both middle turbinates to prevent synechiae as well as assist in hemostasis.  A drip pad was placed and patient was turned over to Anesthesia for extubation and awakening.  She tolerated the procedure very well and was brought back to postop recovery in stable condition.

## 2019-01-08 NOTE — ANESTHESIA PREPROCEDURE EVALUATION
Anesthesia Evaluation     Patient summary reviewed and Nursing notes reviewed   NPO Solid Status: > 8 hours  NPO Liquid Status: > 8 hours           Airway   Mallampati: I  TM distance: >3 FB  Neck ROM: full  No difficulty expected  Dental - normal exam     Pulmonary - normal exam   Cardiovascular     Rhythm: regular  Rate: normal        Neuro/Psych  GI/Hepatic/Renal/Endo      Musculoskeletal     Abdominal    Substance History      OB/GYN          Other            Phys Exam Other: Hx of bleeding disorder                Anesthesia Plan    ASA 2     general     intravenous induction   Anesthetic plan, all risks, benefits, and alternatives have been provided, discussed and informed consent has been obtained with: patient.

## 2019-01-08 NOTE — H&P
[facility] Pre-OP H&P      Chief complaint:  Altered sense of smell    Subjective:  Patient is a 43 y.o.female presents with history altered sense of smell, nasal congestion, sinus drainage and sinus pressure for several months. She was treated with antibiotic and steroid regimen but showed no improvement.  She underwent CT of sinuses which showed blockage of sinuses on left.  (See office note on chart dated 12/05/18 - Diego Lemus MD for details).  She is here today for BILATERAL TOTAL ETHMOIDECTOMY,BILATERAL FRONTAL SINUPLASTY WITH TISSUE REMOVAL, BILATERAL MAXILLARY SINUPLASTY WITH TISSUE REMOVAL.     Review of Systems:  General ROS: negative for fever, chills, weakness, dizziness, headache, fatigue, weight changes  Cardiovascular ROS: no chest pain or dyspnea on exertion  Respiratory ROS: no cough, shortness of breath, or wheezing  GI ROS: no abdominal pain/discomfort, nausea, vomiting or diarrhea   ROS: no dysuria, hematuria or complaints.  History of Von Willebrand's and Factor XII deficiency.  Cleared by hematology.    Skin ROS: no itching, rash or open wounds.      Allergies:   Allergies   Allergen Reactions   • Aspirin Other (See Comments)     Pt has Von Willebrand disease    • Ibuprofen Other (See Comments)     Pt has Von Willebrand disease    Latex: no known allergy  Contrast Dye:  no known allergy      Home Meds    No medications prior to admission.     PMH:   Past Medical History:   Diagnosis Date   • Factor XII deficiency (CMS/HCC)    • Von Willebrand disease (CMS/HCC)     diagnosed at age 6   • Wears glasses     reading and driving      PSH:    Past Surgical History:   Procedure Laterality Date   • BREAST BIOPSY Right 2008   • BREAST EXCISIONAL BIOPSY Right 2007   • HERNIA REPAIR      age 6   • URETHERAL RE-IMPLANTATION  AGE 4   • WISDOM TOOTH EXTRACTION       Immunization History: pneumonia=no      Flu=yes     Tetanus=yes     Social History:  Social History     Tobacco Use   • Smoking  status: Never Smoker   • Smokeless tobacco: Never Used   Substance Use Topics   • Alcohol use: No           Physical Exam:/91 (BP Location: Right arm, Patient Position: Sitting)   Pulse 83   Temp 97.9 °F (36.6 °C) (Temporal)   Resp 18   LMP 01/01/2019 Comment: last mammogram oct 2018  SpO2 100%   Breastfeeding? No       General Appearance:    Alert, cooperative, no distress, appears stated age   Head:    Normocephalic, without obvious abnormality, atraumatic   Lungs:     Clear to auscultation bilaterally, respirations unlabored    Heart: Regular rate and rhythm, S1 and S2 normal, no murmur, rub    or gallop    Abdomen:   Soft without tenderness, non-distended, normal bowel sounds, no mass palpated.   Breast Exam:    deferred   Genitalia:    deferred   Extremities:   Extremities normal, atraumatic, no cyanosis or edema   Skin:   Skin color, texture, turgor normal, no rashes or lesions   Neurologic:   Grossly intact     Results Review:  I have reviewed the patient's current clinical labs.     Cancer Staging:   Cancer Patient: __ yes __no __unknown; If yes, clinical stage T:__ N:__M:__, stage group    Impression:  Parosmia, dysfunction of eustachian tube, acute and chronic sinusitis     Plan:   BILATERAL TOTAL ETHMOIDECTOMY,BILATERAL FRONTAL SINUPLASTY WITH TISSUE REMOVAL, BILATERAL MAXILLARY SINUPLASTY WITH TISSUE REMOVAL    GAYLA Sandoval 1/8/2019 2:04 PM

## 2019-01-09 LAB
ABO + RH BLD: NORMAL
BH BB BLOOD EXPIRATION DATE: NORMAL
BH BB BLOOD TYPE BARCODE: 5100
BH BB BLOOD TYPE BARCODE: 6200
BH BB BLOOD TYPE BARCODE: 6200
BH BB BLOOD TYPE BARCODE: 9500
BH BB DISPENSE STATUS: NORMAL
BH BB PRODUCT CODE: NORMAL
BH BB UNIT NUMBER: NORMAL
UNIT  ABO: NORMAL
UNIT  RH: NORMAL

## 2019-01-11 LAB
CYTO UR: NORMAL
LAB AP CASE REPORT: NORMAL
LAB AP CLINICAL INFORMATION: NORMAL
PATH REPORT.FINAL DX SPEC: NORMAL
PATH REPORT.GROSS SPEC: NORMAL

## 2019-01-12 LAB
BACTERIA SPEC AEROBE CULT: ABNORMAL
BACTERIA SPEC AEROBE CULT: ABNORMAL
GRAM STN SPEC: ABNORMAL

## 2019-01-16 LAB
BACTERIA SPEC ANAEROBE CULT: ABNORMAL
BACTERIA SPEC ANAEROBE CULT: ABNORMAL

## 2020-01-10 ENCOUNTER — TRANSCRIBE ORDERS (OUTPATIENT)
Dept: ADMINISTRATIVE | Facility: HOSPITAL | Age: 45
End: 2020-01-10

## 2020-01-10 DIAGNOSIS — Z12.31 VISIT FOR SCREENING MAMMOGRAM: Primary | ICD-10-CM

## 2020-03-11 ENCOUNTER — HOSPITAL ENCOUNTER (OUTPATIENT)
Dept: MAMMOGRAPHY | Facility: HOSPITAL | Age: 45
Discharge: HOME | End: 2020-03-11
Admitting: OBSTETRICS & GYNECOLOGY

## 2020-03-11 DIAGNOSIS — Z12.31 VISIT FOR SCREENING MAMMOGRAM: ICD-10-CM

## 2020-03-11 PROCEDURE — 77063 BREAST TOMOSYNTHESIS BI: CPT | Performed by: RADIOLOGY

## 2020-03-11 PROCEDURE — 77063 BREAST TOMOSYNTHESIS BI: CPT

## 2020-03-11 PROCEDURE — 77067 SCR MAMMO BI INCL CAD: CPT

## 2020-03-11 PROCEDURE — 77067 SCR MAMMO BI INCL CAD: CPT | Performed by: RADIOLOGY

## 2021-04-13 ENCOUNTER — TRANSCRIBE ORDERS (OUTPATIENT)
Dept: ADMINISTRATIVE | Facility: HOSPITAL | Age: 46
End: 2021-04-13

## 2021-04-13 DIAGNOSIS — Z12.31 ENCOUNTER FOR SCREENING MAMMOGRAM FOR MALIGNANT NEOPLASM OF BREAST: Primary | ICD-10-CM

## 2021-05-26 ENCOUNTER — HOSPITAL ENCOUNTER (OUTPATIENT)
Dept: MAMMOGRAPHY | Facility: HOSPITAL | Age: 46
Discharge: HOME | End: 2021-05-26

## 2021-05-26 ENCOUNTER — TELEPHONE (OUTPATIENT)
Dept: OBSTETRICS AND GYNECOLOGY | Facility: CLINIC | Age: 46
End: 2021-05-26

## 2021-05-26 DIAGNOSIS — Z12.31 ENCOUNTER FOR SCREENING MAMMOGRAM FOR MALIGNANT NEOPLASM OF BREAST: ICD-10-CM

## 2021-05-26 NOTE — TELEPHONE ENCOUNTER
Pt lvm stating she needs to speak with a nurse about getting a diagnostic mammogram scheduled after having her mammogram today. She would like a call back.

## 2021-05-26 NOTE — TELEPHONE ENCOUNTER
Patient states that she went to her mammogram appointment today after having PCP check lymph nodes at right armpit. PCP told her to tell mammography she was having swollen lymph nodes x6 months, and they made her cancel the appointment for a diagnostic.    Per Dr. Suárez, they will not see patient diagnostically without a proper assessment. Patient has an annual scheduled on 6/7. Patient may wait until appointment for assessment to get diagnostic appointment underway. Verbalized understanding.

## 2021-06-07 ENCOUNTER — OFFICE VISIT (OUTPATIENT)
Dept: OBSTETRICS AND GYNECOLOGY | Facility: CLINIC | Age: 46
End: 2021-06-07

## 2021-06-07 VITALS
HEIGHT: 69 IN | DIASTOLIC BLOOD PRESSURE: 80 MMHG | WEIGHT: 181.2 LBS | SYSTOLIC BLOOD PRESSURE: 138 MMHG | BODY MASS INDEX: 26.84 KG/M2

## 2021-06-07 DIAGNOSIS — N63.0 BREAST LUMP: Primary | ICD-10-CM

## 2021-06-07 DIAGNOSIS — N63.0 BREAST LUMP: ICD-10-CM

## 2021-06-07 DIAGNOSIS — Z01.419 WOMEN'S ANNUAL ROUTINE GYNECOLOGICAL EXAMINATION: Primary | ICD-10-CM

## 2021-06-07 PROCEDURE — 99213 OFFICE O/P EST LOW 20 MIN: CPT | Performed by: OBSTETRICS & GYNECOLOGY

## 2021-06-07 RX ORDER — SPIRONOLACTONE 100 MG/1
TABLET, FILM COATED ORAL
COMMUNITY
End: 2021-11-08

## 2021-06-07 RX ORDER — COVID-19 MOLECULAR TEST ASSAY
KIT MISCELLANEOUS SEE ADMIN INSTRUCTIONS
COMMUNITY
Start: 2021-04-24 | End: 2022-06-28

## 2021-06-07 RX ORDER — ERGOCALCIFEROL 1.25 MG/1
1 CAPSULE ORAL WEEKLY
COMMUNITY
Start: 2020-01-13 | End: 2021-07-20

## 2021-06-07 RX ORDER — HYDROCODONE BITARTRATE AND ACETAMINOPHEN 5; 325 MG/1; MG/1
1 TABLET ORAL 3 TIMES DAILY
COMMUNITY
Start: 2021-03-23 | End: 2021-07-20

## 2021-06-07 RX ORDER — CHOLECALCIFEROL (VITAMIN D3) 1250 MCG
CAPSULE ORAL
COMMUNITY
Start: 2021-05-02 | End: 2021-07-20

## 2021-06-07 RX ORDER — DOXYCYCLINE 100 MG/1
100 CAPSULE ORAL 2 TIMES DAILY
COMMUNITY
Start: 2021-03-20 | End: 2021-07-20

## 2021-06-07 NOTE — PROGRESS NOTES
GYN breast check only patient on her period.    CC - Here for breast check only    Subjective   HPI  Lisa Martin is a 45 y.o. female, , who presents for breast check. her last LMP was Patient's last menstrual period was 2021..  Periods are regular every 28-30 days, lasting 6 days.  Dysmenorrhea:moderate, occurring throughout cycle.  Patient reports problems with: a pea size lump inside of her vagina .  Partner Status: Marital Status: .    Desires STD Screening: YES/NO/Other: no.  Pt needs a new order for her mammogram.   Pt cant have pap done today started her period she is okay to schedule another appointment to have that done.     Additional OB/GYN History   Current contraception: vasectomy     Last Pap : 2019  Last Completed Pap Smear          PAP SMEAR (Every 3 Years) Next due on 3/29/2022    2019  Done - in [place]-Neg, Neg              History of abnormal Pap smear: yes  Family history of uterine, colon, breast, or ovarian cancer: no  Last mammogram: 2020   Last Completed Mammogram     This patient has no relevant Health Maintenance data.        Last colonoscopy: will have one on Friday  Last Completed Colonoscopy     This patient has no relevant Health Maintenance data.        Last DEXA: yes 2021- normal   Exercises Regularly: no  Feelings of Anxiety or Depression: no    Tobacco Usage?: no     Health Maintenance   Topic Date Due   • Annual Gynecologic Pelvic and Breast Exam  Never done   • COLORECTAL CANCER SCREENING  Never done   • ANNUAL PHYSICAL  Never done   • TDAP/TD VACCINES (1 - Tdap) Never done   • HEPATITIS C SCREENING  Never done   • MAMMOGRAM  2021   • INFLUENZA VACCINE  2021   • PAP SMEAR  2022   • COVID-19 Vaccine  Completed   • Pneumococcal Vaccine 0-64  Aged Out       Current Outpatient Medications   Medication Sig Dispense Refill   • cholecalciferol (VITAMIN D3) 1.25 MG (12960 UT) capsule cholecalciferol (vitamin D3)  "1,250 mcg (50,000 unit) capsule   Take 1 capsule every week by oral route.     • spironolactone (ALDACTONE) 100 MG tablet spironolactone 100 mg tablet   Take 1 tablet every day by oral route.     • betamethasone valerate (VALISONE) 0.1 % cream daily for influenza prophylaxis (to keep you from getting the Flu) (0.1 %)     • Cholecalciferol (Vitamin D3) 1.25 MG (83653 UT) capsule      • doxycycline (MONODOX) 100 MG capsule Take 100 mg by mouth 2 (Two) Times a Day.     • HYDROcodone-acetaminophen (NORCO) 5-325 MG per tablet Take 1 tablet by mouth 3 (Three) Times a Day.     • ID Now COVID-19 kit See Admin Instructions. for testing     • vitamin D (ERGOCALCIFEROL) 1.25 MG (61076 UT) capsule capsule Take 1 capsule by mouth 1 (One) Time Per Week.       No current facility-administered medications for this visit.       The additional following portions of the patient's history were reviewed and updated as appropriate: allergies, current medications, past family history, past medical history, past social history, past surgical history and problem list.    Review of Systems   Constitutional: Negative.    HENT: Negative.    Eyes: Negative.    Respiratory: Negative.    Cardiovascular: Negative.    Gastrointestinal: Negative.    Endocrine: Negative.    Genitourinary: Negative.    Musculoskeletal: Negative.    Skin: Negative.    Allergic/Immunologic: Negative.    Neurological: Negative.    Hematological: Negative.    Psychiatric/Behavioral: Negative.        I have reviewed and agree with the HPI, ROS, and historical information as entered above. Andrew Suárez MD    Objective   /80   Ht 175.3 cm (69\")   Wt 82.2 kg (181 lb 3.2 oz)   LMP 06/06/2021   Breastfeeding No   BMI 26.76 kg/m²     PE  Breasts without masses, history of biopsies on the right  Right axilla with a small 1 cm thickening, possibly muscle, possible lump  This is located right in the center of the axillary area, nontender  Left axilla " normal    Assessment/Plan     Assessment         1. Possible lump thickening right axillary area get a diagnostic mammogram    Plan     1. We will plan on rescheduling her annual exam in 2 to 6 weeks  2. At that time we will rediscuss her heavy periods, options include IUD or ablation  3. Patient has a pea-sized lump at her left introitus  We will do her full annual with Pap at that time  Andrew Suárez MD  06/07/2021

## 2021-06-08 PROBLEM — N63.0 BREAST LUMP: Status: ACTIVE | Noted: 2021-06-08

## 2021-06-14 DIAGNOSIS — N63.0 BREAST LUMP: Primary | ICD-10-CM

## 2021-06-23 ENCOUNTER — HOSPITAL ENCOUNTER (OUTPATIENT)
Dept: MAMMOGRAPHY | Facility: HOSPITAL | Age: 46
Discharge: HOME | End: 2021-06-23

## 2021-06-23 ENCOUNTER — HOSPITAL ENCOUNTER (OUTPATIENT)
Dept: ULTRASOUND IMAGING | Facility: HOSPITAL | Age: 46
Discharge: HOME | End: 2021-06-23

## 2021-06-23 DIAGNOSIS — N63.0 BREAST LUMP: ICD-10-CM

## 2021-06-23 PROCEDURE — 77062 BREAST TOMOSYNTHESIS BI: CPT | Performed by: RADIOLOGY

## 2021-06-23 PROCEDURE — 77066 DX MAMMO INCL CAD BI: CPT | Performed by: RADIOLOGY

## 2021-06-23 PROCEDURE — 76642 ULTRASOUND BREAST LIMITED: CPT | Performed by: RADIOLOGY

## 2021-06-23 PROCEDURE — 76642 ULTRASOUND BREAST LIMITED: CPT

## 2021-06-23 PROCEDURE — G0279 TOMOSYNTHESIS, MAMMO: HCPCS

## 2021-06-23 PROCEDURE — 77066 DX MAMMO INCL CAD BI: CPT

## 2021-07-20 ENCOUNTER — OFFICE VISIT (OUTPATIENT)
Dept: OBSTETRICS AND GYNECOLOGY | Facility: CLINIC | Age: 46
End: 2021-07-20

## 2021-07-20 VITALS
WEIGHT: 178.6 LBS | HEIGHT: 69 IN | BODY MASS INDEX: 26.45 KG/M2 | SYSTOLIC BLOOD PRESSURE: 134 MMHG | DIASTOLIC BLOOD PRESSURE: 88 MMHG

## 2021-07-20 DIAGNOSIS — N92.0 MENORRHAGIA WITH REGULAR CYCLE: ICD-10-CM

## 2021-07-20 DIAGNOSIS — L72.3 SEBACEOUS CYST: ICD-10-CM

## 2021-07-20 DIAGNOSIS — Z01.419 ENCOUNTER FOR ANNUAL ROUTINE GYNECOLOGICAL EXAMINATION: Primary | ICD-10-CM

## 2021-07-20 PROCEDURE — 99396 PREV VISIT EST AGE 40-64: CPT | Performed by: OBSTETRICS & GYNECOLOGY

## 2021-07-20 RX ORDER — TRANEXAMIC ACID 650 MG/1
TABLET ORAL
Qty: 30 TABLET | Refills: 12 | Status: SHIPPED | OUTPATIENT
Start: 2021-07-20 | End: 2021-11-08

## 2021-07-20 NOTE — PROGRESS NOTES
"GYN Annual Exam     CC - Here for annual exam.     Subjective   HPI  Lisa Martin is a 46 y.o. female, , who presents for annual well woman exam.  Her last LMP was Patient's last menstrual period was 2021 (within days). Periods are regular every 28-30 days, lasting 5-7 days, described as heavy on days 2-4 with clots. On heaviest days, changes super plus tampon hourly.  Dysmenorrhea: moderate, occurring the first 3 days.  Patient reports problems with: \"lump\" at inner left labia that has not changed in size; non-painful and present for approximately 6 months. Partner Status: Marital Status: .  New Partners since last visit: no.  Desires STD Screening: no.    Patient reports issues with \"getting all the urine out\"; first noticed after delivery of last child. Within the past 2 months, it has become more noticeable. Denies dysuria. Mild stress incontinence noted.    Additional OB/GYN History   Current contraception: 's vasectomy  Last Pap :   Last Completed Pap Smear          Ordered - PAP SMEAR (Every 3 Years) Ordered on 2019  Done - negative, HPV negative              History of abnormal Pap smear: yes  Family history of uterine, colon, breast, or ovarian cancer: no  Performs monthly Self-Breast Exam: yes  Last mammogram:   Last Completed Mammogram          MAMMOGRAM (Yearly) Next due on 2021  Mammo Diagnostic Digital Tomosynthesis Bilateral With CAD    2020  Mammo Screening Digital Tomosynthesis Bilateral With CAD    10/19/2018  Mammo Screening Digital Tomosynthesis Bilateral With CAD    10/18/2017  Mammo Screening Digital Tomosynthesis Bilateral With CAD              Feelings of Anxiety or Depression: no  Tobacco Usage?: No   OB History        2    Para   2    Term   2            AB        Living   2       SAB        TAB        Ectopic        Molar        Multiple        Live Births   2                Health " "Maintenance   Topic Date Due   • Annual Gynecologic Pelvic and Breast Exam  Never done   • COLORECTAL CANCER SCREENING  Never done   • ANNUAL PHYSICAL  Never done   • TDAP/TD VACCINES (1 - Tdap) Never done   • HEPATITIS C SCREENING  Never done   • INFLUENZA VACCINE  10/01/2021   • PAP SMEAR  03/29/2022   • MAMMOGRAM  06/23/2022   • COVID-19 Vaccine  Completed   • Pneumococcal Vaccine 0-64  Aged Out         Current Outpatient Medications:   •  spironolactone (ALDACTONE) 100 MG tablet, spironolactone 100 mg tablet  Take 1 tablet every day by oral route., Disp: , Rfl:   •  ID Now COVID-19 kit, See Admin Instructions. for testing, Disp: , Rfl:   •  Tranexamic Acid (Lysteda) 650 MG tablet, 2 tablets by mouth 3 times daily for 5 days, Disp: 30 tablet, Rfl: 12    The additional following portions of the patient's history were reviewed and updated as appropriate: allergies, current medications, past family history, past medical history, past social history, past surgical history and problem list.    Review of Systems   Constitutional: Negative.    HENT: Negative.    Eyes: Negative.    Respiratory: Negative.    Cardiovascular: Negative.    Gastrointestinal: Negative.    Endocrine: Negative.    Genitourinary: Breast mass: lump located at right underarm; dx mammogram negative.        Inability to completely void  Menorrhagia  Lump at inner left labia   Musculoskeletal: Negative.    Skin: Negative.    Allergic/Immunologic: Negative.    Neurological: Negative.    Hematological: Negative.    Psychiatric/Behavioral: Negative.        I have reviewed and agree with the HPI, ROS, and historical information as entered above. Andrew Suárez MD    Objective   /88 (BP Location: Right arm, Patient Position: Sitting, Cuff Size: Adult)   Ht 175.3 cm (69\")   Wt 81 kg (178 lb 9.6 oz)   LMP 07/06/2021 (Within Days)   Breastfeeding No   BMI 26.37 kg/m²     PE    Breast: Without masses ,nontender, no skin changes or " retractions  Axilla: Normal, no lymphadenopathy  Heart: Regular rate no murmurs rubs or gallops  Lungs: Clear to auscultation, normal breath sounds bilaterally  Abdomen: Soft nontender, no hepatosplenomegaly, no guarding or rebound, no masses  Pelvic exam -small sebaceous cysts on her left labia just lateral to the clitoris, nontender  External genitalia: Normal introitus and vulva  Vagina: Normal mucosa no bleeding inflammation or discharge  Bladder: Normal position nontender  Urethral meatus and urethra: Normal nontender  Cervix: No lesions, no discharge, bleeding or inflammation  Bimanual: Nontender adnexa clear, no sign of uterine or ovarian enlargement       Assessment/Plan     Assessment     Problem List Items Addressed This Visit        Genitourinary and Reproductive     Menorrhagia with regular cycle    Overview     Day 2 through 4            Skin    Sebaceous cyst    Overview     Left vulva           Other Visit Diagnoses     Encounter for annual routine gynecological examination    -  Primary    Relevant Orders    IGP, Rfx Aptima HPV ASCU          1. GYN annual well woman exam.   2. Normal exam, history of abnormal Paps patient wants yearly Paps    Plan     1. Reviewed HPV guidelines and she would like to continue yearly paps regardless.   2. Reviewed monthly self breast exams.  Instructed to call with lumps, pain, or breast discharge.  Yearly mammograms ordered.  3. Anticipatory menopausal guidance given.    Information on preventative health initiatives given    Andrew Suárez MD  07/20/2021

## 2021-11-03 ENCOUNTER — TELEPHONE (OUTPATIENT)
Dept: OBSTETRICS AND GYNECOLOGY | Facility: CLINIC | Age: 46
End: 2021-11-03

## 2021-11-03 DIAGNOSIS — N93.9 ABNORMAL UTERINE BLEEDING (AUB): Primary | ICD-10-CM

## 2021-11-03 NOTE — TELEPHONE ENCOUNTER
"Patient reports first day of menstrual approximately 10/19.    AUB for 1 week prior to 16-day menstrual (spotting). Bleeding described as bright red and  light-to-medium (with variant amounts when wiping). Cramping noted. Small clots yesterday.    Last month's menstrual mostly normal, but super heavy saturation after getting up from couch one day.    Also reports she's been \"passing gas vaginally\" for the past few weeks.    Denies recently taking steroids or having the COVID vaccine.    U/S on 11/8 at 3pm; Jagdeep at 3:40pm  "

## 2021-11-08 ENCOUNTER — OFFICE VISIT (OUTPATIENT)
Dept: OBSTETRICS AND GYNECOLOGY | Facility: CLINIC | Age: 46
End: 2021-11-08

## 2021-11-08 VITALS
DIASTOLIC BLOOD PRESSURE: 86 MMHG | HEIGHT: 69 IN | WEIGHT: 188.6 LBS | BODY MASS INDEX: 27.93 KG/M2 | SYSTOLIC BLOOD PRESSURE: 136 MMHG

## 2021-11-08 DIAGNOSIS — N93.9 ABNORMAL UTERINE BLEEDING (AUB): Primary | ICD-10-CM

## 2021-11-08 DIAGNOSIS — R93.89 THICKENED ENDOMETRIUM: ICD-10-CM

## 2021-11-08 PROCEDURE — 58100 BIOPSY OF UTERUS LINING: CPT | Performed by: NURSE PRACTITIONER

## 2021-11-08 PROCEDURE — 99213 OFFICE O/P EST LOW 20 MIN: CPT | Performed by: NURSE PRACTITIONER

## 2021-11-08 NOTE — PROGRESS NOTES
Chief Complaint   Patient presents with   • Follow-up            HPI  Lisa Martin is a 46 y.o. female, , who presents with initial evaluation of Abnormal Uterine Bleeding      Her last LMP was 10/19/2021. Patient is premenopausal..  Periods are regular every 25-35 days, lasting 3 days.     She states she has experienced this problem for several days.  She describes the severity as off and on.  Patient stated that the color of her blood changes when she has this bleeding. Patient stated that sometimes her period blood is red, sometimes it was brown, sometimes it was light red.  Patient stated one episode she bled through a tampon, a thick pad, and her underwear when she stood up. Patient stated that every time she wipes she has blood. Patient stated that she has cramping associated  with the bleeding. Patient stated that her bleeding is light. Patient stated that she had small clots during these bleeding episodes.     Did the patient have u/s today? Yes.  Findings showed endometrium 38.7 mm, right ovarian cyst 30 x 17 x 27 mm anechoic with irregular borders..  I have personally evaluated the U/S and agree with the findings. Tessa Gannon, APRN    Additional OB/GYN History   Last Pap :   Last Completed Pap Smear          PAP SMEAR (Every 3 Years) Next due on 2021  SCANNED - PAP SMEAR    2019  Done - negative, HPV negative              History of abnormal Pap smear: yes - History of Abnormal Pap  Tobacco Usage?: No     The additional following portions of the patient's history were reviewed and updated as appropriate: allergies and current medications.    Review of Systems   Constitutional: Negative.    Gastrointestinal: Negative.    Genitourinary: Positive for menstrual problem and vaginal bleeding.     All other systems reviewed and are negative.     I have reviewed and agree with the HPI, ROS, and historical information as entered above. Tessa Gannon,  "APRN    Objective   /86   Ht 175.3 cm (69\")   Wt 85.5 kg (188 lb 9.6 oz)   LMP 10/19/2021   Breastfeeding No   BMI 27.85 kg/m²     Physical Exam  Vitals and nursing note reviewed. Exam conducted with a chaperone present.   Constitutional:       Appearance: Normal appearance.   Pulmonary:      Effort: Pulmonary effort is normal.   Abdominal:      Palpations: Abdomen is soft.   Genitourinary:     Labia:         Right: No rash, tenderness or lesion.         Left: No rash, tenderness or lesion.       Vagina: Bleeding present. No lesions.      Cervix: No cervical motion tenderness, discharge, lesion or cervical bleeding.      Uterus: Normal. Not enlarged, not fixed and not tender.       Adnexa:         Right: No mass or tenderness.          Left: No mass or tenderness.        Rectum: No external hemorrhoid.      Comments: Chaperone Present  Neurological:      Mental Status: She is alert.            Assessment and Plan    Problem List Items Addressed This Visit     None      Visit Diagnoses     Abnormal uterine bleeding (AUB)    -  Primary    Relevant Orders    Tissue Pathology Exam    US Non-ob Transvaginal    CBC (No Diff)    TSH Rfx On Abnormal To Free T4    Comprehensive Metabolic Panel    Thickened endometrium        Relevant Orders    Tissue Pathology Exam    US Non-ob Transvaginal    CBC (No Diff)    TSH Rfx On Abnormal To Free T4    Comprehensive Metabolic Panel          Lab(s) Ordered  Medication(s) Ordered, Aygestin 5 mg daily x 14 days  Call for heavy bleeding > 1 pad per hour  Follow Up: Return in about 4 weeks (around 12/6/2021) for 4-5 weeks Ultrasound RWO.    Tessa Gannon, APRN  11/08/2021  "

## 2021-11-08 NOTE — PROGRESS NOTES
Endometrial Biopsy    Lisa Martin is a 46 y.o. female,   , whose last menstrual period was Patient's last menstrual period was 10/19/2021..  The patient has a history of dysfunctional uterine bleeding and presents for an endometrial biopsy.  Patient reports vaginal bleeding.  She reports the bleeding as light. It has previously occurred infrequently.  Patient has been using none.  Patient also complains of cramping.  .  After the indications, risks, benefits, and alternatives to performing and endometrial biopsy were explained to the patient.  A urine pregnancy test was not indicated, vasectomy for contraception.     PROCEDURE:  The patient was placed on the table in the supine lithotomy position.  She was draped in the appropriate manner.  A speculum was placed in the vagina.  The cervix was visualized and prepped with Betadine. A tenaculum was placed. A small plastic 5 mm Pipelle syringe curette was inserted into the cervical canal.  The uterus was sounded to 9 cms.  A vigorous four quadrant biopsy was performed, removing a moderate amount of tissue.  This tissue was placed in Formalin and sent to pathology.  The patient tolerated the procedure well and reported Mild cramping.  She had Mild cramping at the time of discharge.  Physical Exam  Vitals and nursing note reviewed. Exam conducted with a chaperone present.   Constitutional:       Appearance: Normal appearance.   Pulmonary:      Effort: Pulmonary effort is normal.   Abdominal:      Palpations: Abdomen is soft.   Genitourinary:     Labia:         Right: No rash, tenderness or lesion.         Left: No rash, tenderness or lesion.       Vagina: Bleeding present. No lesions.      Cervix: No cervical motion tenderness, discharge, lesion or cervical bleeding.      Uterus: Normal. Not enlarged, not fixed and not tender.       Adnexa:         Right: No mass or tenderness.          Left: No mass or tenderness.        Rectum: No external  hemorrhoid.      Comments: Chaperone Present  Neurological:      Mental Status: She is alert.         Procedures    Review of Systems   Constitutional: Negative.    Gastrointestinal: Negative.    Genitourinary: Positive for vaginal bleeding.         Plan:  Orders Placed This Encounter   Procedures   • Endometrial Biopsy     This order was created via procedure documentation     Order Specific Question:   Release to patient     Answer:   Immediate   • US Non-ob Transvaginal     Standing Status:   Future     Standing Expiration Date:   11/8/2022     Order Specific Question:   Reason for Exam:     Answer:   aub   • CBC (No Diff)     Order Specific Question:   Release to patient     Answer:   Immediate   • TSH Rfx On Abnormal To Free T4     Order Specific Question:   Release to patient     Answer:   Immediate   • Comprehensive Metabolic Panel     Order Specific Question:   Release to patient     Answer:   Immediate       Problem List Items Addressed This Visit     None      Visit Diagnoses     Abnormal uterine bleeding (AUB)    -  Primary    Relevant Orders    Tissue Pathology Exam    US Non-ob Transvaginal    CBC (No Diff)    TSH Rfx On Abnormal To Free T4    Comprehensive Metabolic Panel    Thickened endometrium        Relevant Orders    Tissue Pathology Exam    US Non-ob Transvaginal    CBC (No Diff)    TSH Rfx On Abnormal To Free T4    Comprehensive Metabolic Panel              Instructions  1. Call the office in 5 business days for biopsy results.  2. Patient instructed to call the office if develops a fever of 100.4 or greater, vaginal bleeding heavier than a period, foul vaginal discharge or pain.      Tessa Gannon, APRN

## 2021-11-09 LAB
ALBUMIN SERPL-MCNC: 4.4 G/DL (ref 3.8–4.8)
ALBUMIN/GLOB SERPL: 1.3 {RATIO} (ref 1.2–2.2)
ALP SERPL-CCNC: 88 IU/L (ref 44–121)
ALT SERPL-CCNC: 16 IU/L (ref 0–32)
AST SERPL-CCNC: 16 IU/L (ref 0–40)
BILIRUB SERPL-MCNC: 0.3 MG/DL (ref 0–1.2)
BUN SERPL-MCNC: 14 MG/DL (ref 6–24)
BUN/CREAT SERPL: 18 (ref 9–23)
CALCIUM SERPL-MCNC: 9.2 MG/DL (ref 8.7–10.2)
CHLORIDE SERPL-SCNC: 102 MMOL/L (ref 96–106)
CO2 SERPL-SCNC: 21 MMOL/L (ref 20–29)
CREAT SERPL-MCNC: 0.8 MG/DL (ref 0.57–1)
ERYTHROCYTE [DISTWIDTH] IN BLOOD BY AUTOMATED COUNT: 12.5 % (ref 11.7–15.4)
GLOBULIN SER CALC-MCNC: 3.5 G/DL (ref 1.5–4.5)
GLUCOSE SERPL-MCNC: 81 MG/DL (ref 65–99)
HCT VFR BLD AUTO: 41.6 % (ref 34–46.6)
HGB BLD-MCNC: 14.3 G/DL (ref 11.1–15.9)
MCH RBC QN AUTO: 31.3 PG (ref 26.6–33)
MCHC RBC AUTO-ENTMCNC: 34.4 G/DL (ref 31.5–35.7)
MCV RBC AUTO: 91 FL (ref 79–97)
PLATELET # BLD AUTO: 258 X10E3/UL (ref 150–450)
POTASSIUM SERPL-SCNC: 4.3 MMOL/L (ref 3.5–5.2)
PROT SERPL-MCNC: 7.9 G/DL (ref 6–8.5)
RBC # BLD AUTO: 4.57 X10E6/UL (ref 3.77–5.28)
SODIUM SERPL-SCNC: 137 MMOL/L (ref 134–144)
TSH SERPL DL<=0.005 MIU/L-ACNC: 3.41 UIU/ML (ref 0.45–4.5)
WBC # BLD AUTO: 12.1 X10E3/UL (ref 3.4–10.8)

## 2021-11-12 ENCOUNTER — TELEPHONE (OUTPATIENT)
Dept: OBSTETRICS AND GYNECOLOGY | Facility: CLINIC | Age: 46
End: 2021-11-12

## 2021-11-12 DIAGNOSIS — N93.9 ABNORMAL UTERINE BLEEDING (AUB): ICD-10-CM

## 2021-11-12 DIAGNOSIS — R93.89 THICKENED ENDOMETRIUM: ICD-10-CM

## 2021-11-16 ENCOUNTER — TELEPHONE (OUTPATIENT)
Dept: OBSTETRICS AND GYNECOLOGY | Facility: CLINIC | Age: 46
End: 2021-11-16

## 2021-11-16 NOTE — TELEPHONE ENCOUNTER
S/w pt she wanted to know more information about her WBC in recent labs and if she needs to come in sooner or is okay to keep f/u appt with Dr. Suárez. I told patient per lab result notes from provider : she just needs to keep her follow up appt. And if anything changes then CB. She v/u

## 2021-11-16 NOTE — TELEPHONE ENCOUNTER
Patient called and would like to speak with nurse. [facility] and would like to touch base on that as well as that the patient noticed elevated white blood cell count on most recent labs as well as previous labs from PCP in April.

## 2021-12-20 ENCOUNTER — OFFICE VISIT (OUTPATIENT)
Dept: OBSTETRICS AND GYNECOLOGY | Facility: CLINIC | Age: 46
End: 2021-12-20

## 2021-12-20 VITALS
DIASTOLIC BLOOD PRESSURE: 90 MMHG | SYSTOLIC BLOOD PRESSURE: 138 MMHG | BODY MASS INDEX: 27.78 KG/M2 | HEIGHT: 69 IN | WEIGHT: 187.6 LBS

## 2021-12-20 DIAGNOSIS — N93.9 ABNORMAL UTERINE BLEEDING (AUB): Primary | ICD-10-CM

## 2021-12-20 DIAGNOSIS — R93.89 THICKENED ENDOMETRIUM: ICD-10-CM

## 2021-12-20 LAB
ERYTHROCYTE [DISTWIDTH] IN BLOOD BY AUTOMATED COUNT: 11.8 % (ref 12.3–15.4)
HCT VFR BLD AUTO: 39.7 % (ref 34–46.6)
HGB BLD-MCNC: 13.2 G/DL (ref 12–15.9)
MCH RBC QN AUTO: 30.1 PG (ref 26.6–33)
MCHC RBC AUTO-ENTMCNC: 33.2 G/DL (ref 31.5–35.7)
MCV RBC AUTO: 90.6 FL (ref 79–97)
PLATELET # BLD AUTO: 263 10*3/MM3 (ref 140–450)
RBC # BLD AUTO: 4.38 10*6/MM3 (ref 3.77–5.28)
WBC # BLD AUTO: 6.16 10*3/MM3 (ref 3.4–10.8)

## 2021-12-20 PROCEDURE — 99213 OFFICE O/P EST LOW 20 MIN: CPT | Performed by: OBSTETRICS & GYNECOLOGY

## 2021-12-20 NOTE — PROGRESS NOTES
Gynecologic Exam Note      Chief Complaint   Patient presents with   • Follow-up     abnormal uterine bleeding       CC:  AUB    Subjective   HPI  Lisa Martin is a 46 y.o. female, , who presents for follow up evaluation of Abnormal Uterine Bleeding.      At last visit 2021, she had an U/S and endometrial biopsy (see labs). The patient was prescribed aygestin for 14 days. Tuesday/Wednesday, she had spotting, then her menstrual flow began on Thursday. Heavy bleeding Thursday and Friday, then it stopped. Today, barely any bleeding at all (occasional spotting with wiping).    Patient's last menstrual period was 2021 (exact date). Periods are regular every 25-35 days, lasting 5-6 days, described as heavy with clots. On her heaviest days, changes pad/tampons (super plus) every 1-2 hours. Partner Status: Marital Status: .    Patient with concerns about elevated WBC at last visit and requesting redraw of CBC.    Additional OB/GYN History   Last Pap :   Last Completed Pap Smear          PAP SMEAR (Every 3 Years) Next due on 2021  SCANNED - PAP SMEAR    2019  Done - negative, HPV negative              History of abnormal Pap smear: yes - led to cryo  Tobacco Usage?: No     The additional following portions of the patient's history were reviewed and updated as appropriate: allergies, current medications, past family history, past medical history, past social history, past surgical history and problem list.    Review of Systems   Constitutional: Negative.    HENT: Negative.    Eyes: Negative.    Respiratory: Negative.    Cardiovascular: Negative.    Gastrointestinal: Negative.    Endocrine: Negative.    Genitourinary: Negative.    Musculoskeletal: Negative.    Skin: Negative.    Allergic/Immunologic: Negative.    Neurological: Negative.    Hematological: Negative.    Psychiatric/Behavioral: Negative.        I have reviewed and agree with the HPI, ROS, and  "historical information as entered above. Andrew Suárez MD    Objective   /90 (BP Location: Right arm, Patient Position: Sitting, Cuff Size: Adult)   Ht 175.3 cm (69\")   Wt 85.1 kg (187 lb 9.6 oz)   LMP 12/16/2021 (Exact Date)   Breastfeeding No   BMI 27.70 kg/m²     Physical Exam    Assessment/Plan     Assessment     Problem List Items Addressed This Visit        Genitourinary and Reproductive     Abnormal uterine bleeding (AUB) - Primary    Relevant Orders    CBC (No Diff)    Thickened endometrium        Bleeding improved Dimitriou has thinned from 38mm to 8 mm    Plan cyclic progestin repeat ultrasound in 3 months    1. Lab(s) Ordered  2. Ultrasound in 3 months  3. Aygestin 5 mg daily 14 on 14 off x3 cycles      Andrew Suárez MD  12/20/2021  "

## 2022-03-21 ENCOUNTER — OFFICE VISIT (OUTPATIENT)
Dept: OBSTETRICS AND GYNECOLOGY | Facility: CLINIC | Age: 47
End: 2022-03-21

## 2022-03-21 VITALS
SYSTOLIC BLOOD PRESSURE: 152 MMHG | WEIGHT: 195.8 LBS | HEIGHT: 69 IN | DIASTOLIC BLOOD PRESSURE: 82 MMHG | BODY MASS INDEX: 29 KG/M2

## 2022-03-21 DIAGNOSIS — N95.1 PERIMENOPAUSAL: ICD-10-CM

## 2022-03-21 DIAGNOSIS — N93.9 ABNORMAL UTERINE BLEEDING (AUB): Primary | ICD-10-CM

## 2022-03-21 DIAGNOSIS — R93.89 THICKENED ENDOMETRIUM: Primary | ICD-10-CM

## 2022-03-21 PROCEDURE — 99212 OFFICE O/P EST SF 10 MIN: CPT | Performed by: OBSTETRICS & GYNECOLOGY

## 2022-03-21 RX ORDER — LUBIPROSTONE 8 UG/1
8 CAPSULE ORAL 2 TIMES DAILY WITH MEALS
COMMUNITY
End: 2022-06-28

## 2022-03-21 NOTE — PROGRESS NOTES
Chief Complaint   Patient presents with   • Follow-up     AUB       Subjective   HPI  Lisa Martin is a 46 y.o. female, , who presents for AUB and bloating.  The bleeding has stopped. Abdominal bloating started in Oct, abdomen hard to touch and painful at times.  Has seen a GI doctor and diagnosed IBS with constipation.  Intermittent and present more often.  Bloating has not stopped since trying new medication amitiza.      She states she has experienced this problem for 5 month.  She describes the severity as moderate.  She states that the problem is annoying.  Her last LMP was Patient's last menstrual period was 2022 (approximate)..  Periods are irregular, lasting 3 days.  Dysmenorrhea:mild, occurring first 1-2 days of flow.  Patient reports problems with: none.  Partner Status: Marital Status: .  New Partners since last visit: no.  Desires STD Screening: no.    Additional OB/GYN History   Current contraception: contraceptive methods: Vasectomy   Desires to: continue contraception  Last Pap : 21  Last Completed Pap Smear          PAP SMEAR (Every 3 Years) Next due on 2021  SCANNED - PAP SMEAR    2019  Done - negative, HPV negative              History of abnormal Pap smear: yes - dysplasia  Last mammogram: 21  Last Completed Mammogram          MAMMOGRAM (Yearly) Next due on 2021  Mammo Diagnostic Digital Tomosynthesis Bilateral With CAD    2020  Mammo Screening Digital Tomosynthesis Bilateral With CAD    10/19/2018  Mammo Screening Digital Tomosynthesis Bilateral With CAD    10/18/2017  Mammo Screening Digital Tomosynthesis Bilateral With CAD              Tobacco Usage?: No   OB History        2    Para   2    Term   2       0    AB   0    Living   2       SAB   0    IAB   0    Ectopic   0    Molar   0    Multiple   0    Live Births   2                Health Maintenance   Topic Date Due   • COLORECTAL  "CANCER SCREENING  Never done   • ANNUAL PHYSICAL  Never done   • COVID-19 Vaccine (1) Never done   • TDAP/TD VACCINES (1 - Tdap) Never done   • HEPATITIS C SCREENING  Never done   • INFLUENZA VACCINE  08/01/2021   • MAMMOGRAM  06/23/2022   • Annual Gynecologic Pelvic and Breast Exam  07/21/2022   • PAP SMEAR  07/20/2024   • Pneumococcal Vaccine 0-64  Aged Out       The additional following portions of the patient's history were reviewed and updated as appropriate: allergies, current medications, past family history, past medical history, past social history, past surgical history and problem list.    Review of Systems    I have reviewed and agree with the HPI, ROS, and historical information as entered above. Andrew Suárez MD    Objective   /82   Ht 175.3 cm (69\")   Wt 88.8 kg (195 lb 12.8 oz)   LMP 02/25/2022 (Approximate)   Breastfeeding No   BMI 28.91 kg/m²     Physical Exam  Physical exam today  Review of ultrasound  Assessment/Plan     Assessment     Problem List Items Addressed This Visit        Genitourinary and Reproductive     Thickened endometrium - Primary    Perimenopausal      Possible IBS  Pap is due in July 2022  Plan     Return in about 3 months (around 6/21/2022).  1. Add Metamucil  Annual exam and Pap doing July    Andrew Suárez MD  03/21/2022  "

## 2022-06-07 ENCOUNTER — TRANSCRIBE ORDERS (OUTPATIENT)
Dept: ADMINISTRATIVE | Facility: HOSPITAL | Age: 47
End: 2022-06-07

## 2022-06-07 DIAGNOSIS — Z12.31 VISIT FOR SCREENING MAMMOGRAM: Primary | ICD-10-CM

## 2022-06-28 ENCOUNTER — OFFICE VISIT (OUTPATIENT)
Dept: OBSTETRICS AND GYNECOLOGY | Facility: CLINIC | Age: 47
End: 2022-06-28

## 2022-06-28 VITALS
DIASTOLIC BLOOD PRESSURE: 80 MMHG | BODY MASS INDEX: 29.3 KG/M2 | WEIGHT: 197.8 LBS | HEIGHT: 69 IN | SYSTOLIC BLOOD PRESSURE: 126 MMHG

## 2022-06-28 DIAGNOSIS — Z87.42 HISTORY OF ABNORMAL CERVICAL PAP SMEAR: ICD-10-CM

## 2022-06-28 DIAGNOSIS — K59.00 CONSTIPATION, UNSPECIFIED CONSTIPATION TYPE: ICD-10-CM

## 2022-06-28 DIAGNOSIS — Z01.419 WELL WOMAN EXAM WITH ROUTINE GYNECOLOGICAL EXAM: Primary | ICD-10-CM

## 2022-06-28 DIAGNOSIS — N81.89 PELVIC RELAXATION: ICD-10-CM

## 2022-06-28 PROCEDURE — 99396 PREV VISIT EST AGE 40-64: CPT | Performed by: OBSTETRICS & GYNECOLOGY

## 2022-06-28 RX ORDER — HYDROCHLOROTHIAZIDE 12.5 MG/1
12.5 TABLET ORAL DAILY
COMMUNITY
Start: 2022-06-16

## 2022-06-28 RX ORDER — OMEPRAZOLE 20 MG/1
20 CAPSULE, DELAYED RELEASE ORAL 2 TIMES DAILY
COMMUNITY
Start: 2022-06-21

## 2022-06-28 NOTE — PROGRESS NOTES
"GYN Annual Exam     CC - Here for annual exam.     Subjective   HPI  Lisa Martin is a 47 y.o. female, , who presents for annual well woman exam. Periods were regular until his past .  She has had light bleeding q day for the past month, but never enough to saturate or fill a pad.    Patient reports she continues to have problems with: \"abdominal distention\" vaginal prolapse, and weight gain.  Partner Status: Marital Status: .  New Partners since last visit: no.  Desires STD Screening: no.      She was evaluated in November for AUB and EMB was negative. U/s on 21 showed EMC of 38.7mm so she was prescribed Aygestin that she took x 14wks,.  She had a CT scan in 2022    She also c/o vaginal prolapse x 6 months and \"passes gas vaginally.\"     Last colonoscopy was in summer of 2021 and was wnl per pt. She is seeing GI and been diagnosed with IBS and constipation.     Additional OB/GYN History     Current contraception: contraceptive methods: Vasectomy     Last Pap : neg  Last Completed Pap Smear          Ordered - PAP SMEAR (Every 3 Years) Ordered on 2021  SCANNED - PAP SMEAR    2019  Done - negative, HPV negative              History of abnormal Pap smear: yes - .h/o cryo and HPV  Family history of uterine, colon, breast, or ovarian cancer: no  Previous Mammogram :  yes - . diagnostic mammogram and ultrasound last year was wnl  Performs monthly Self-Breast Exam: yes  Exercises Regularly: no  Feelings of Anxiety or Depression: no  Tobacco Usage?: No   OB History        2    Para   2    Term   2       0    AB   0    Living   2       SAB   0    IAB   0    Ectopic   0    Molar   0    Multiple   0    Live Births   2                Health Maintenance   Topic Date Due   • COLORECTAL CANCER SCREENING  Never done   • ANNUAL PHYSICAL  Never done   • TDAP/TD VACCINES (1 - Tdap) Never done   • HEPATITIS C SCREENING  Never done   • COVID-19 Vaccine (3 - " "Booster for Moderna series) 09/06/2021   • MAMMOGRAM  06/23/2022   • INFLUENZA VACCINE  10/01/2022   • Annual Gynecologic Pelvic and Breast Exam  06/29/2023   • PAP SMEAR  07/20/2024   • Pneumococcal Vaccine 0-64  Aged Out     [unfilled]      The additional following portions of the patient's history were reviewed and updated as appropriate: allergies, current medications, past family history, past medical history, past social history, past surgical history and problem list.    Review of Systems   Constitutional: Positive for unexpected weight gain.   HENT: Negative.    Respiratory: Negative.    Cardiovascular: Negative.    Gastrointestinal: Negative.    Genitourinary: Positive for menstrual problem.   Musculoskeletal: Negative.    Skin: Negative.    Allergic/Immunologic: Negative.    Neurological: Negative.    Hematological: Negative.    Psychiatric/Behavioral: Negative.        I have reviewed and agree with the HPI, ROS, and historical information as entered above. Marianne Vivas MD    Objective   /80   Ht 175.3 cm (69\")   Wt 89.7 kg (197 lb 12.8 oz)   LMP 05/31/2022 (Approximate)   Breastfeeding No   BMI 29.21 kg/m²     Physical Exam  Vitals and nursing note reviewed. Exam conducted with a chaperone present.   Constitutional:       Appearance: She is well-developed.   HENT:      Head: Normocephalic and atraumatic.   Eyes:      Pupils: Pupils are equal, round, and reactive to light.   Neck:      Thyroid: No thyroid mass or thyromegaly.   Pulmonary:      Effort: Pulmonary effort is normal. No retractions.   Chest:      Chest wall: No mass.   Breasts:      Right: Normal. No mass, nipple discharge, skin change or tenderness.      Left: Normal. No mass, nipple discharge, skin change or tenderness.       Abdominal:      General: Bowel sounds are normal.      Palpations: Abdomen is soft. Abdomen is not rigid. There is no mass.      Tenderness: There is no abdominal tenderness. There is no guarding.      " Hernia: No hernia is present. There is no hernia in the left inguinal area or right inguinal area.   Genitourinary:     Exam position: Lithotomy position.      Pubic Area: No rash.       Labia:         Right: No rash, tenderness or lesion.         Left: No rash, tenderness or lesion.       Urethra: No urethral pain or urethral swelling.      Vagina: Normal. No vaginal discharge or lesions.      Cervix: No cervical motion tenderness, discharge, lesion or cervical bleeding.      Uterus: Normal. Not enlarged, not fixed and not tender.       Adnexa:         Right: No mass, tenderness or fullness.          Left: No mass, tenderness or fullness.        Rectum: No external hemorrhoid.   Musculoskeletal:      Cervical back: Normal range of motion. No muscular tenderness.      Right lower leg: No edema.      Left lower leg: No edema.   Skin:     General: Skin is warm and dry.   Neurological:      Mental Status: She is alert and oriented to person, place, and time.      Motor: Motor function is intact.   Psychiatric:         Mood and Affect: Mood and affect normal.         Behavior: Behavior normal.     grade 1-2 cystocele and rectocele    Assessment & Plan       Encounter Diagnoses   Name Primary?   • Well woman exam with routine gynecological exam Yes   • History of abnormal cervical Pap smear    • Pelvic relaxation    • Constipation, unspecified constipation type        Plan     1. Recommended use of Vitamin D replacement and getting adequate calcium in her diet. (1500mg)  2. Reviewed monthly self breast exams.  Instructed to call with lumps, pain, or breast discharge.    3. Continue yearly mammography  4. Reviewed HPV guidelines.  5. Reviewed exercise as a preventative health measures.    6. Will make referal to pelvic floor PT to try and help with her pelvic relaxation, she has mild cystocele and rectocele.  7. She will cont FU with GI for her bloating and constipation issues.   8. She may be interested in low dose ocp  in future to see if helps her symptoms      Marianne Vivas MD  06/28/2022

## 2022-06-30 ENCOUNTER — HOSPITAL ENCOUNTER (OUTPATIENT)
Dept: MAMMOGRAPHY | Facility: HOSPITAL | Age: 47
Discharge: HOME | End: 2022-06-30
Admitting: OBSTETRICS & GYNECOLOGY

## 2022-06-30 DIAGNOSIS — Z12.31 VISIT FOR SCREENING MAMMOGRAM: ICD-10-CM

## 2022-06-30 LAB — REF LAB TEST METHOD: NORMAL

## 2022-06-30 PROCEDURE — 77063 BREAST TOMOSYNTHESIS BI: CPT | Performed by: RADIOLOGY

## 2022-06-30 PROCEDURE — 77067 SCR MAMMO BI INCL CAD: CPT | Performed by: RADIOLOGY

## 2022-06-30 PROCEDURE — 77067 SCR MAMMO BI INCL CAD: CPT

## 2022-06-30 PROCEDURE — 77063 BREAST TOMOSYNTHESIS BI: CPT

## 2022-09-06 ENCOUNTER — TELEPHONE (OUTPATIENT)
Dept: OBSTETRICS AND GYNECOLOGY | Facility: CLINIC | Age: 47
End: 2022-09-06

## 2022-09-06 NOTE — TELEPHONE ENCOUNTER
Spotting on 7/26 then seemed like a normal period. And for the past 3 weeks passed blood clots dime size to half dollar sized. Then the bleeding stopped and then yesterday pt passed another clot again about half dollar sized. Per the notes under her lab results in July may need a endometrial biopsy if aub continues. Pt wants to know what type of appt she needs to schedule, etc. The bleeding is different this time than before per pt.

## 2022-10-11 ENCOUNTER — OFFICE VISIT (OUTPATIENT)
Dept: OBSTETRICS AND GYNECOLOGY | Facility: CLINIC | Age: 47
End: 2022-10-11

## 2022-10-11 VITALS
DIASTOLIC BLOOD PRESSURE: 80 MMHG | HEIGHT: 69 IN | BODY MASS INDEX: 29.47 KG/M2 | WEIGHT: 199 LBS | SYSTOLIC BLOOD PRESSURE: 110 MMHG

## 2022-10-11 DIAGNOSIS — N93.9 ABNORMAL UTERINE BLEEDING (AUB): Primary | ICD-10-CM

## 2022-10-11 PROCEDURE — 58100 BIOPSY OF UTERUS LINING: CPT | Performed by: NURSE PRACTITIONER

## 2022-10-11 NOTE — PROGRESS NOTES
Gynecologic Procedure Note        Endometrial Biopsy      Indications:@ is a 47 y.o. , who presents today for endometrial biopsy.  The patient was noted to have Abnormal Uterine Bleeding.  Her LMP is 10-. After being presented with the risk, benefits, and specific detail of the procedure, the patient wished to proceed.  Written consent was obtained from patient.   Urine pregnancy test was Not indicated, patient's  has had a vasectomy and she cannot urinate. Patient does not have an allergy to betadine or shellfish.     Procedure Details   The patient was placed on the table in the dorsal lithotomy position.  She was draped in the appropriate manner.  A speculum was placed in the vagina.  The cervix was visualized and prepped with Betadine.  A tenaculum was not placed on the anterior lip of the cervix for traction.  A small plastic 5 mm Pipelle syringe curette was inserted into the cervical canal.  The uterus was sounded to 8 cm's.  A vigorous four quadrant biopsy was performed, removing a medium amount of tissue.  The tissue was placed in Formalin and sent to Pathology.  The patient tolerated the procedure well and she reported mild cramping.  The tenaculum was removed from the cervix and the speculum was removed.  The patient was observed for 10 minutes.           Complications: none.       Problem List Items Addressed This Visit        Genitourinary and Reproductive     Abnormal uterine bleeding (AUB) - Primary    Relevant Orders    TISSUE EXAM, [facility] ([facility])       Instructions  1. Call the office in 5 business days for biopsy results.  2. Patient instructed to call the office if develops a fever of 100.4 or greater, vaginal bleeding heavier than a period, foul vaginal discharge or pain.    3.  Rev options including OCP, IUD, ablation, progesterone.  Rev risks, benefits, side effects of Mirena; info given.  She may be interested.  RTO for US; if all wnl, she may want Mirena.      Michelle Baron, APRN  10/11/2022

## 2022-10-13 ENCOUNTER — TELEPHONE (OUTPATIENT)
Dept: OBSTETRICS AND GYNECOLOGY | Facility: CLINIC | Age: 47
End: 2022-10-13

## 2022-10-13 LAB — REF LAB TEST METHOD: NORMAL

## 2022-10-13 NOTE — TELEPHONE ENCOUNTER
Reviewed pathology appears benign. She cannot take motrin due to Von Willebrand Diease. Her bleeding is not heavier than her period and she denies fever or foul odor. Reassurance given. Keep US f/u as scheduled.

## 2022-10-13 NOTE — TELEPHONE ENCOUNTER
Pt stated she has been bleeding very heavy since endometrial biopsy stated she was unsure if she started her period but wanted to speak to a nurse to determine if there was a way to tell the difference

## 2022-11-03 ENCOUNTER — OFFICE VISIT (OUTPATIENT)
Dept: OBSTETRICS AND GYNECOLOGY | Facility: CLINIC | Age: 47
End: 2022-11-03

## 2022-11-03 VITALS — BODY MASS INDEX: 29.74 KG/M2 | SYSTOLIC BLOOD PRESSURE: 110 MMHG | WEIGHT: 201.4 LBS | DIASTOLIC BLOOD PRESSURE: 80 MMHG

## 2022-11-03 DIAGNOSIS — N93.9 ABNORMAL UTERINE BLEEDING (AUB): ICD-10-CM

## 2022-11-03 DIAGNOSIS — Z30.430 ENCOUNTER FOR IUD INSERTION: Primary | ICD-10-CM

## 2022-11-03 LAB
B-HCG UR QL: NEGATIVE
EXPIRATION DATE: NORMAL
INTERNAL NEGATIVE CONTROL: NORMAL
INTERNAL POSITIVE CONTROL: NORMAL
Lab: NORMAL

## 2022-11-03 PROCEDURE — 81025 URINE PREGNANCY TEST: CPT | Performed by: NURSE PRACTITIONER

## 2022-11-03 PROCEDURE — 99213 OFFICE O/P EST LOW 20 MIN: CPT | Performed by: NURSE PRACTITIONER

## 2022-11-03 PROCEDURE — 58300 INSERT INTRAUTERINE DEVICE: CPT | Performed by: NURSE PRACTITIONER

## 2022-11-03 NOTE — PROGRESS NOTES
Chief Complaint   Patient presents with   • Follow up     Abnormal uterine bleeding       Subjective   HPI  Lisa Martin is a 47 y.o. female, .   Patient's last menstrual period was 10/10/2022 (approximate).. who presents for follow up of AUB.      EMB at last visit was wnl.  She is interested in MIrena insertion today.       TFTs checked <one year ago.  However, she is wondering if she needs more extensive thyroid labs.  She also wonders about thyroid nodule due to neck/throat complaints.    She also has had ice cravings and requests to be checked for anemia.      Additional OB/GYN History     Last Pap : 2022  Last Completed Pap Smear          PAP SMEAR (Every 3 Years) Next due on 2022  LIQUID-BASED PAP SMEAR, [facility] ([facility])    2021  SCANNED - PAP SMEAR    2019  Done - negative, HPV negative                Last mammogram: 2022  Last Completed Mammogram          Ordered - MAMMOGRAM (Yearly) Ordered on 2022  Mammo Screening Digital Tomosynthesis Bilateral With CAD    2021  Mammo Diagnostic Digital Tomosynthesis Bilateral With CAD    2020  Mammo Screening Digital Tomosynthesis Bilateral With CAD    10/19/2018  Mammo Screening Digital Tomosynthesis Bilateral With CAD    10/18/2017  Mammo Screening Digital Tomosynthesis Bilateral With CAD                Tobacco Usage?: No   OB History        2    Para   2    Term   2       0    AB   0    Living   2       SAB   0    IAB   0    Ectopic   0    Molar   0    Multiple   0    Live Births   2                  Current Outpatient Medications:   •  hydroCHLOROthiazide (HYDRODIURIL) 12.5 MG tablet, Take 12.5 mg by mouth Daily., Disp: , Rfl:   •  omeprazole (priLOSEC) 20 MG capsule, Take 20 mg by mouth 2 (Two) Times a Day., Disp: , Rfl:     Current Facility-Administered Medications:   •  Levonorgestrel (MIRENA) 20 MCG/DAY IUD intrauterine device 1 each, 1  each, Intrauterine, Once, Tod, Michelle Marquez, APRN     Past Medical History:   Diagnosis Date   • 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, Factor 8, Factor 12   • Cervical dysplasia    • Factor VIII (functional) deficiency (HCC)    • Factor XII deficiency (HCC)    • History of abnormal cervical Papanicolaou smear 2005    led to cryo   • History of abnormal uterine bleeding    • History of vertigo    • Irritable bowel syndrome    • Ovarian cyst    • Pelvic prolapse    • PMS (premenstrual syndrome)    • Von Willebrand disease     diagnosed at age 6   • Wears glasses     reading and driving         Past Surgical History:   Procedure Laterality Date   • BREAST BIOPSY Right 2008    x 2   • BREAST EXCISIONAL BIOPSY Right 2007    x 2   • BREAST LUMPECTOMY Right 2008    BENIGN   • COLPOSCOPY  2005   • ENDOSCOPIC FUNCTIONAL SINUS SURGERY (FESS) Bilateral 1/8/2019    Procedure: BILATERAL TOTAL ETHMOIDECTOMY,BILATERAL FRONTAL SINUPLASTY WITH TISSUE REMOVAL, BILATERAL MAXILLARY SINUPLASTY WITH TISSUE REMOVAL;  Surgeon: Jose Camacho MD;  Location: [facility];  Service: ENT   • FERTILITY SURGERY      1 round IUI with Jules   • GYNECOLOGIC CRYOSURGERY  11/2005   • HERNIA REPAIR      age 6   • INGUINAL HERNIA REPAIR  1981   • URETHERAL RE-IMPLANTATION  AGE 4   • WISDOM TOOTH EXTRACTION         The additional following portions of the patient's history were reviewed and updated as appropriate: allergies, current medications, past family history, past medical history, past social history, past surgical history and problem list.    Review of Systems    I have reviewed and agree with the HPI, ROS, and historical information as entered above. Michelle Baron, APRN    Objective   /80   Wt 91.4 kg (201 lb 6.4 oz)   LMP 10/10/2022 (Approximate)   BMI 29.74 kg/m²     Physical Exam  Vitals and nursing note reviewed. Exam conducted with a chaperone present.   Constitutional:       General: She is not in acute distress.      Appearance: Normal appearance. She is not ill-appearing.   Pulmonary:      Effort: Pulmonary effort is normal.   Abdominal:      General: There is no distension.      Palpations: Abdomen is soft. There is no mass.      Tenderness: There is no abdominal tenderness. There is no guarding or rebound.      Hernia: No hernia is present.   Genitourinary:     General: Normal vulva.      Vagina: Normal.      Cervix: Normal.      Uterus: Normal.       Adnexa: Right adnexa normal and left adnexa normal.   Skin:     General: Skin is warm and dry.   Neurological:      Mental Status: She is alert and oriented to person, place, and time.   Psychiatric:         Mood and Affect: Mood normal.         Behavior: Behavior normal.         Assessment & Plan     Assessment     Problem List Items Addressed This Visit        Genitourinary and Reproductive     Abnormal uterine bleeding (AUB)    Relevant Orders    CBC & Differential    TSH    T4, Free   Other Visit Diagnoses     Encounter for IUD insertion    -  Primary    Relevant Medications    Levonorgestrel (MIRENA) 20 MCG/DAY IUD intrauterine device 1 each    Other Relevant Orders    POC Pregnancy, Urine (Completed)          Plan     D/w pt US wnl today.  Repeat US 6-8 wks ov cyst 3 cm simple.        Michelle Baron, APRN  11/03/2022          Gynecologic Procedure Note        Procedure: IUD Insertion     Procedures    Pre procedure indication 1) Desires Mirena  Post procedure indication 1) Desires Mirena    NDC: Mirena 68864-221-72  Lot #: [id number]  Exp Date: 11/2024  BH device    The risks, benefits, and alternatives to Mirena were explained at length with the patient. All her questions were answered and consents were signed.  Her LMP was 10/11/22 .  Urine Pregnancy Test was Negative.  Patient does not have an allergy to betadine or shellfish.    The patient was placed in a dorsal lithotomy position on the examining table in [facility]. A bimanual exam confirmed the uterus was retroverted. A  speculum was inserted into the vagina and the cervix was brought into view.  The cervix was prepped with Betadine. The anterior lip of the cervix was then grasped with a single-tooth tenaculum. The endometrial cavity was then sounded to 10 centimeters. The sealed Mirena package was opened and the IUD was removed in a sterile fashion.    The upper edge of the depth setting the flange was set at the uterine sound measurement. The  was then carefully advanced to the cervical canal into the uterus to the level of the fundus.  The slider was then retracted about 1 cm and deployed the device. The device was then gently advanced to the fundus. The IUD was then released by pulling the slider down all the way. The  was removed carefully from the uterus. The threads were then cut leaving 2-3 cm visible outside of the cervix.  The single-tooth tenaculum was removed from the anterior lip. Good hemostasis was noted.   All other instruments were removed from the vagina.       The patient tolerated the procedure very well with a mild amount of discomfort.  She was monitored for 10 minutes prior to discharge.      There were no complications.    The patient was counseled about the need to return in 4 weeks for IUD check.     She was counseled about the need to use a backup method of contraception such as condoms until her post insertion exam was performed. The patient verbalized understanding when the IUD will need to be removed/replaced. Written information was given to the patient.  The patient is counseled to contact us if she has any significant or increasing bleeding, pain, fever, chills, or other concerns. She is instructed to see a doctor right away if she believes that she may be pregnant at any time with the IUD in place.    Michelle Baron, APRN  11/03/2022

## 2022-11-04 LAB
BASOPHILS # BLD AUTO: 0.06 10*3/MM3 (ref 0–0.2)
BASOPHILS NFR BLD AUTO: 0.6 % (ref 0–1.5)
EOSINOPHIL # BLD AUTO: 0.2 10*3/MM3 (ref 0–0.4)
EOSINOPHIL NFR BLD AUTO: 1.9 % (ref 0.3–6.2)
ERYTHROCYTE [DISTWIDTH] IN BLOOD BY AUTOMATED COUNT: 12 % (ref 12.3–15.4)
HCT VFR BLD AUTO: 33 % (ref 34–46.6)
HGB BLD-MCNC: 10.5 G/DL (ref 12–15.9)
IMM GRANULOCYTES # BLD AUTO: 0.04 10*3/MM3 (ref 0–0.05)
IMM GRANULOCYTES NFR BLD AUTO: 0.4 % (ref 0–0.5)
LYMPHOCYTES # BLD AUTO: 2.12 10*3/MM3 (ref 0.7–3.1)
LYMPHOCYTES NFR BLD AUTO: 19.6 % (ref 19.6–45.3)
MCH RBC QN AUTO: 26.7 PG (ref 26.6–33)
MCHC RBC AUTO-ENTMCNC: 31.8 G/DL (ref 31.5–35.7)
MCV RBC AUTO: 84 FL (ref 79–97)
MONOCYTES # BLD AUTO: 0.83 10*3/MM3 (ref 0.1–0.9)
MONOCYTES NFR BLD AUTO: 7.7 % (ref 5–12)
NEUTROPHILS # BLD AUTO: 7.56 10*3/MM3 (ref 1.7–7)
NEUTROPHILS NFR BLD AUTO: 69.8 % (ref 42.7–76)
NRBC BLD AUTO-RTO: 0 /100 WBC (ref 0–0.2)
PLATELET # BLD AUTO: 267 10*3/MM3 (ref 140–450)
RBC # BLD AUTO: 3.93 10*6/MM3 (ref 3.77–5.28)
T4 FREE SERPL-MCNC: 1.08 NG/DL (ref 0.93–1.7)
TSH SERPL DL<=0.005 MIU/L-ACNC: 1.43 UIU/ML (ref 0.27–4.2)
WBC # BLD AUTO: 10.81 10*3/MM3 (ref 3.4–10.8)

## 2022-11-29 DIAGNOSIS — N83.209 CYST OF OVARY, UNSPECIFIED LATERALITY: Primary | ICD-10-CM

## 2022-12-01 ENCOUNTER — OFFICE VISIT (OUTPATIENT)
Dept: OBSTETRICS AND GYNECOLOGY | Facility: CLINIC | Age: 47
End: 2022-12-01

## 2022-12-01 VITALS — SYSTOLIC BLOOD PRESSURE: 128 MMHG | BODY MASS INDEX: 29.59 KG/M2 | DIASTOLIC BLOOD PRESSURE: 80 MMHG | WEIGHT: 200.4 LBS

## 2022-12-01 DIAGNOSIS — Z30.432 ENCOUNTER FOR IUD REMOVAL: ICD-10-CM

## 2022-12-01 DIAGNOSIS — N93.9 ABNORMAL UTERINE BLEEDING (AUB): Primary | ICD-10-CM

## 2022-12-01 PROCEDURE — 58301 REMOVE INTRAUTERINE DEVICE: CPT | Performed by: NURSE PRACTITIONER

## 2022-12-01 PROCEDURE — 99212 OFFICE O/P EST SF 10 MIN: CPT | Performed by: NURSE PRACTITIONER

## 2022-12-01 NOTE — PROGRESS NOTES
Chief Complaint   Patient presents with   • IUD check         Subjective   HPI  Lisa Martin is a 47 y.o. female, , who presents for IUD check follow up.  She had a Mirena placed on 11/3/2022. Since the IUD placement, the patient reports heavy bleeding causing her to bleed through a pad/tampon Q1-2H several times.   She denies pain.     The additional following portions of the patient's history were reviewed and updated as appropriate: allergies, current medications, past family history, past medical history, past social history, past surgical history and problem list.    Did the patient have u/s today? Yes.  Findings showed IUD too low in uterus.  I have personally evaluated the U/S and agree with the findings. RAFAEL Kendall    Review of Systems  All other systems reviewed and are negative.     I have reviewed and agree with the HPI, ROS, and historical information as entered above. RAFAEL Kendall    Objective   /80   Wt 90.9 kg (200 lb 6.4 oz)   LMP  (LMP Unknown)   BMI 29.59 kg/m²     Physical Exam  Constitutional:       General: She is not in acute distress.     Appearance: Normal appearance. She is not ill-appearing.   Pulmonary:      Effort: Pulmonary effort is normal.   Abdominal:      General: There is no distension.      Palpations: Abdomen is soft. There is no mass.      Tenderness: There is no abdominal tenderness. There is no guarding or rebound.      Hernia: No hernia is present.   Genitourinary:     General: Normal vulva.      Cervix: Normal.      Uterus: Normal.       Adnexa: Right adnexa normal and left adnexa normal.      Comments: IUD strings noted  Skin:     General: Skin is warm and dry.   Neurological:      Mental Status: She is alert and oriented to person, place, and time.   Psychiatric:         Mood and Affect: Mood normal.         Behavior: Behavior normal.         Assessment & Plan     Assessment     Problem List Items Addressed This Visit         Genitourinary and Reproductive     Abnormal uterine bleeding (AUB) - Primary   Other Visit Diagnoses     Encounter for IUD removal              Plan     1. IUD too low in uterus to help AUB.  Options reviewed, including POPs, ablation.  Slynd sample given.  Novasure info given and reviewed.  She declines try IUD again.    Return if symptoms worsen or fail to improve, for Annual physical.    Procedure: IUD Removal     Procedures    Pre procedure indication     1) Desires Removal of IUD    Post procedure indication   1) Desires Removal of IUD    IUD too low in uterus on US today.  She agrees to removal.  Questions answered.    The patient was placed in a dorsal lithotomy position on the examining table in stirps.  A speculum was inserted into the vagina and the cervix was brought into view.  An IUD string was visualized.  The string was then grasped and removed intact with gentle traction.  There was a Minimal amount of bleeding and cramping.    All  instruments were removed from the vagina.       The patient tolerated the procedure well with a mild amount of discomfort.  She was monitored for 5 minutes prior to discharge.      There were no complications.    Vasectomy    Problem List Items Addressed This Visit        Genitourinary and Reproductive     Abnormal uterine bleeding (AUB) - Primary   Other Visit Diagnoses     Encounter for IUD removal                Michelle Baron, APRN  12/01/2022

## 2023-01-19 ENCOUNTER — TELEPHONE (OUTPATIENT)
Dept: ONCOLOGY | Facility: CLINIC | Age: 48
End: 2023-01-19
Payer: COMMERCIAL

## 2023-01-19 NOTE — TELEPHONE ENCOUNTER
Received fax from [facility] for medical clearance for tooth extraction and implant. Per Dr. Oneal he cant not clear patient at this time he would need to see her first and she would likely need to have this procedure done as an inpatient due to likely needing Humate P, platelets, and plasma. Called [facility] back and informed them of Dr. Oneal recommendations, they do surgeries at [facility] ,which Dr. Oneal would not recommend due to patients risk for bleeding, and inpatient surgeries at [facility], which Dr. Oneal does not have privileges. He states patient can find an oral surgeon that does surgeries at [facility] or find a hematologist at [facility]. Called and notified patient of his recommendation, she did voice frustration due to being diagnosed with these disorders when she was 6 and never having been retested. Informed her that Dr. Oneal is willing to see her before any planning is done to discuss further and encouraged her to voice her questions and concerns for potentially retesting for these disorders and he could work her in within 2 weeks if she decides that she would like to see him. Patient verbalized understanding and will call back if she would like an appt.

## 2023-01-25 NOTE — TELEPHONE ENCOUNTER
Patient called back, she would like an appt to discuss with Dr. Oneal. Offered patient appt for 2/9/23 at 2:30 and she is available. Notified Carlyn HILL, referral coordinator to schedule appt.

## 2023-01-25 NOTE — TELEPHONE ENCOUNTER
PT CALLED WANTED TO SPEAK TO BIENVENIDO AGAIN ABOUT GETTING IN TO DR BURTON REGARDING PREVIOUS MESSAGE

## 2023-02-09 ENCOUNTER — OFFICE VISIT (OUTPATIENT)
Dept: ONCOLOGY | Facility: CLINIC | Age: 48
End: 2023-02-09
Payer: COMMERCIAL

## 2023-02-09 VITALS
RESPIRATION RATE: 18 BRPM | BODY MASS INDEX: 29.62 KG/M2 | DIASTOLIC BLOOD PRESSURE: 86 MMHG | WEIGHT: 200 LBS | OXYGEN SATURATION: 98 % | SYSTOLIC BLOOD PRESSURE: 156 MMHG | HEIGHT: 69 IN | TEMPERATURE: 98.1 F | HEART RATE: 76 BPM

## 2023-02-09 DIAGNOSIS — D68.00 VON WILLEBRAND DISEASE: Primary | ICD-10-CM

## 2023-02-09 PROCEDURE — 99205 OFFICE O/P NEW HI 60 MIN: CPT | Performed by: INTERNAL MEDICINE

## 2023-02-09 RX ORDER — AMLODIPINE BESYLATE 5 MG/1
1 TABLET ORAL EVERY 12 HOURS SCHEDULED
COMMUNITY
Start: 2022-12-23

## 2023-02-09 NOTE — PROGRESS NOTES
CHIEF COMPLAINT: History of bleeding disorder    Problem List:  Oncology/Hematology History   Von Willebrand disease   7/2/2018 Initial Diagnosis    Von Willebrand disease (CMS/[facility]):  referred for history of von Willebrand's disease.  Sometime in her childhood she was tested and told she had von Willebrand's disease but she lived virtually all her life without any bleeding events and no significant problems with bleeding after trauma.  However, before wisdom tooth extraction and subsequently with colonoscopy and with breast biopsies in [place] she was given Stimate because of what ever was found on that testing.  Subsequently she has delivered 2 children without bleeding problems and had a normal bleeding time and factor VIII activity at that junction apparently.  She has had no easy bruising or bleeding or other issues but is in need of varicose vein stripping and before proceeding with that, Dr. Gonsalves wants her von Willebrand's sorted out.  Patient brings records that shows a PTT historically of 62 with a factor XII low at 37%, factor VIII procoagulant of 35% lower limit of normal 50%, factor VIII antigen low at 40% lower limit of normal 70%, platelet ristocetin cofactor of 44%, and platelet collagen aggregation of 18 lower limit of normal 100.  She also brings records from [facility] August 1994 that showed a DDAVP challenge showing factor VIII concentration of 65% increased to 271% one hour post and 168% 3 hours post.  Ristocetin cofactor 31% up to 103% one-hour post and 85% 3 hours post, and a factor VIII antigen of 33% up to 1 hour post 200% and 116% at 3 hours post showing a good response to DDAVP.  According to Dr. 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  Dr. Gaines recommended Lisa received fresh frozen plasma and platelet transfusions both prior to any type of major surgery.           HISTORY OF PRESENT ILLNESS:  The patient is a 47 y.o. female, here for follow up on management of history of bleeding disorders outlined above now in need of molar extraction due to gingival abscess    Past Medical History:   Diagnosis Date   • 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, Factor 8, Factor 12   • Cervical dysplasia    • Factor VIII (functional) deficiency (HCC)    • Factor XII deficiency (HCC)    • History of abnormal cervical Papanicolaou smear 2005    led to cryo   • History of abnormal uterine bleeding    • History of vertigo    • Irritable bowel syndrome    • Ovarian cyst    • Pelvic prolapse    • PMS (premenstrual syndrome)    • Von Willebrand disease     diagnosed at age 6   • Wears glasses     reading and driving      Past Surgical History:   Procedure Laterality Date   • BREAST BIOPSY Right 2008    x 2   • BREAST EXCISIONAL BIOPSY Right 2007    x 2   • BREAST LUMPECTOMY Right 2008    BENIGN   • COLPOSCOPY  2005   • ENDOSCOPIC FUNCTIONAL SINUS SURGERY (FESS) Bilateral 1/8/2019    Procedure: BILATERAL TOTAL ETHMOIDECTOMY,BILATERAL FRONTAL SINUPLASTY WITH TISSUE REMOVAL, BILATERAL MAXILLARY SINUPLASTY WITH TISSUE REMOVAL;  Surgeon: Jose Camacho MD;  Location: [facility];  Service: ENT   • FERTILITY SURGERY      1 round IUI with Jules   • GYNECOLOGIC CRYOSURGERY  11/2005   • HERNIA REPAIR      age 6   • INGUINAL HERNIA REPAIR  1981   • URETHERAL RE-IMPLANTATION  AGE 4   • WISDOM TOOTH EXTRACTION         Allergies   Allergen Reactions   • Aspirin Other (See Comments)     Pt has Von Willebrand disease    • Ibuprofen Other (See Comments)     Pt has Von Willebrand disease        Family History and Social  "History reviewed and changed as necessary    REVIEW OF SYSTEM:   Gingival abscess    PHYSICAL EXAM:  No respiratory distress no acute bleeding petechiae or ecchymoses    Vitals:    02/09/23 1426   BP: 156/86   Pulse: 76   Resp: 18   Temp: 98.1 °F (36.7 °C)   SpO2: 98%   Weight: 90.7 kg (200 lb)   Height: 175.3 cm (69\")     Vitals:    02/09/23 1426   PainSc: 0-No pain          ECOG score: 0           Vitals reviewed.      Lab Results   Component Value Date    HGB 10.5 (L) 11/03/2022    HCT 33.0 (L) 11/03/2022    MCV 84.0 11/03/2022     11/03/2022    WBC 10.81 (H) 11/03/2022    NEUTROABS 7.56 (H) 11/03/2022    LYMPHSABS 2.12 11/03/2022    MONOSABS 0.83 11/03/2022    EOSABS 0.20 11/03/2022    BASOSABS 0.06 11/03/2022       Lab Results   Component Value Date    GLUCOSE 81 11/08/2021    BUN 14 11/08/2021    CREATININE 0.80 11/08/2021     11/08/2021    K 4.3 11/08/2021     11/08/2021    CO2 21 11/08/2021    CALCIUM 9.2 11/08/2021    PROTEINTOT 7.8 07/02/2018    ALBUMIN 4.4 11/08/2021    BILITOT 0.3 11/08/2021    ALKPHOS 88 11/08/2021    AST 16 11/08/2021    ALT 16 11/08/2021             ASSESSMENT & PLAN:  Von Willebrand disease (CMS/[facility]):  referred for history of von Willebrand's disease.  Sometime in her childhood she was tested and told she had von Willebrand's disease but she lived virtually all her life without any bleeding events and no significant problems with bleeding after trauma.  However, before wisdom tooth extraction and subsequently with colonoscopy and with breast biopsies in [place] she was given Stimate because of what ever was found on that testing.  Subsequently she has delivered 2 children without bleeding problems and had a normal bleeding time and factor VIII activity at that junction apparently.  She has had no easy bruising or bleeding or other issues but is in need of varicose vein stripping and before proceeding with that, Dr. Gonsalves wants her von Willebrand's sorted out.  " Patient brings records that shows a PTT historically of 62 with a factor XII low at 37%, factor VIII procoagulant of 35% lower limit of normal 50%, factor VIII antigen low at 40% lower limit of normal 70%, platelet ristocetin cofactor of 44%, and platelet collagen aggregation of 18 lower limit of normal 100.  She also brings records from [facility] August 1994 that showed a DDAVP challenge showing factor VIII concentration of 65% increased to 271% one hour post and 168% 3 hours post.  Ristocetin cofactor 31% up to 103% one-hour post and 85% 3 hours post, and a factor VIII antigen of 33% up to 1 hour post 200% and 116% at 3 hours post showing a good response to DDAVP.  According to Dr. 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  Dr. Gaines recommended Lisa received fresh frozen plasma and platelet transfusions both prior to any type of major surgery.    -2/9/2023 [facility] hematology consult: She returns to me today after the above consult from July 2018 which I reproduced above.  She now has a tooth abscess in need of root canal.  She said that, contrary to the above reports of which I had from Dr. Gaines at the [facility] that far in the past she had received Stimate with teeth extractions and did fine.  I told her that with the odd nature of the factor XII deficiency, alleged von Willebrand's without significant spontaneous bleeding and platelet storage pool defect for which Dr. Gaines recommended fresh frozen plasma and platelet transfusions prior to any surgeries that I would be hesitant for her to have outpatient  oral surgery and in fact, given the vagaries of the above diagnosis and her desire to get her daughters plugged in for further analysis, I am referring her to Dr. Yulisa Stoner who does benign hematology/hemophilia [facility] and I have spoken with that clinic today and will make referral and let them do further evaluation and manage her from this point forward as I am not comfortable making the decision given the rarity and the vagaries of the above findings from the past and the recommendations of aggressive support by her prior University hematologist at [facility] and I would not feel comfortable managing this particular situation and she is appreciative of my efforts to move her in that direction and also wants to have her daughters evaluated which I think they can help with more readily since they have the complex hematology bleeding disorder lab at the [facility] which services the pediatric hematology service where most of these diagnoses first arise.  Total time of care today inclusive of time spent today prior to her arrival reviewing interval history and the plan as outlined above and making phone calls to the [facility] during her visit to make these arrangements and discuss with providers in that clinic and after visit making these referrals took 1 hour patient care time throughout the day today.  Lloyd Oneal MD    02/09/2023

## 2023-02-14 ENCOUNTER — TELEPHONE (OUTPATIENT)
Dept: ONCOLOGY | Facility: CLINIC | Age: 48
End: 2023-02-14

## 2023-02-14 NOTE — TELEPHONE ENCOUNTER
Caller: Lisa Martin    Relationship: Self    Best call back number: [phone]    What is the best time to reach you: ANY    Who are you requesting to speak with (clinical staff, provider,  specific staff member): CLINICAL      What was the call regarding: LISA HAS A REFERRAL TO , SHE HAS NOT HEARD ANYTHING OR BEEN SCHEDULED AND WAS CHECKING ON THE STATUS  SHE STATES SHE WILL BE OUT OF TOWN NEXT WED TILL THE FOLLOWING Monday       Do you require a callback: YES

## 2023-03-06 ENCOUNTER — TELEPHONE (OUTPATIENT)
Dept: OBSTETRICS AND GYNECOLOGY | Facility: CLINIC | Age: 48
End: 2023-03-06
Payer: COMMERCIAL

## 2023-03-06 NOTE — TELEPHONE ENCOUNTER
Per Dr. Carias: she does not think pt. Needs another U/S, and she is happy to see her!    S/w pt she v/u and appt has been made for 3/21/23

## 2023-03-06 NOTE — TELEPHONE ENCOUNTER
"Dr. Vivas pt.  Prev. RWO pt. For years and has previously seen Sandra for AUB, did have Mirena IUD inserted on 11/3/2022 but then was removed on 12/1/2022 due to \"too low in uterus\" per Sandra.  Patient HX: anemia     S/w pt she states when she was in our office on 12/1/22 she picked up a brochure about benign hysterectomy from Dr. Carias with her card on it and has been reading over it considering her options for AUB.     Patient states she would like to switch her gyn care to Dr. Carias and discuss further options for her AUB.     Patients last U/S was done 12/1/2022, and states she did see her hematologist last week at  (Dr. Yulisa Stoner) and labs were done (patient is in the process of having  fax us her recent labs) and Dr. Stoner advised patient to follow up with gynecology to discuss other options.     Per Cinthya: patient is okay to switch providers if she desires.     I told the patient I would discuss this with Dr. Carias and see if she wants another U/S done when I schedule her upcoming appt with her and CB for plan of care. She v/u   "

## 2023-03-06 NOTE — TELEPHONE ENCOUNTER
JIMBO WILLETT    [phone]    PT WANTING TO SWITCH PROVIDERS FROM DR SAMUELS TO DR NGUYEN      PLEASE ADVISE IF THAT'S ACCEPTABLE

## 2023-03-21 ENCOUNTER — OFFICE VISIT (OUTPATIENT)
Dept: OBSTETRICS AND GYNECOLOGY | Facility: CLINIC | Age: 48
End: 2023-03-21
Payer: COMMERCIAL

## 2023-03-21 ENCOUNTER — TELEPHONE (OUTPATIENT)
Dept: OBSTETRICS AND GYNECOLOGY | Facility: CLINIC | Age: 48
End: 2023-03-21

## 2023-03-21 VITALS
DIASTOLIC BLOOD PRESSURE: 84 MMHG | BODY MASS INDEX: 29.95 KG/M2 | HEIGHT: 69 IN | WEIGHT: 202.2 LBS | SYSTOLIC BLOOD PRESSURE: 130 MMHG

## 2023-03-21 DIAGNOSIS — R14.0 BLOATING: ICD-10-CM

## 2023-03-21 DIAGNOSIS — N81.6 RECTOCELE: Primary | ICD-10-CM

## 2023-03-21 DIAGNOSIS — N93.9 ABNORMAL UTERINE BLEEDING (AUB): ICD-10-CM

## 2023-03-21 PROCEDURE — 99214 OFFICE O/P EST MOD 30 MIN: CPT | Performed by: OBSTETRICS & GYNECOLOGY

## 2023-03-21 RX ORDER — TRANEXAMIC ACID 650 MG/1
1300 TABLET ORAL
COMMUNITY
Start: 2023-03-06

## 2023-03-21 NOTE — TELEPHONE ENCOUNTER
HUB UNABLE TO WT        Caller: JIMBO WILLETT    Relationship to patient: self    Best call back number: [phone]    Patient is needing: TO CHANGE PRE OP DATE    ASKED TO TALK TO JENIFER

## 2023-03-21 NOTE — PROGRESS NOTES
"          Chief Complaint   Patient presents with   • Gynecologic Exam   • Follow-up     AUB         Subjective   HPI  Lisa Martin is a 47 y.o. female, , her last LMP was Patient's last menstrual period was 2023 (approximate). Patient states her period started around the first of February and she bled until the first part of March. She is using ultra pads and is having large clots.     She presents for a follow up evaluation of Abnormal Uterine Bleeding. The patient was last seen  for AUB. Since then she has had an US and EMB that was normal. She had a Mirena IUD placed 2022 by RAFAEL Quiros  and had to have it removed on 2022 due to it being too low in the uterus. Patient is here today to discuss other options for AUB possibly surgery.     US was not done today.     Saw Dr. Suárez in the past.    Heavier bleeding since last child (8), worse last couple years.  Has taken tried Lysteda two different times.  Lightened, but then had two periods a month. Also, tried cyclical progesterone.  EMB first was benign.   Tried Mirena - bled the whole time she had it.    Bleeding irregular, lasts about 4wk.  Very very heavy or lighter, but huge clots when sits on toilet.  No reason, pattern.    Has impacted sex life.  Always bleeding afterwards.    Prior to pregnancies, birth control regulated. Not too heavy, cindi like this.    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    H/o pelvic prolapse (Sangeetha)     Feels like everything is pushing out, cindi with bowel movements.  Is splinting.  DIANA, always with sneezing, occ urgency, 1-2x/night    Has gained weight since  (irreg bleeding started then too), has GI issues, bloating (started about then too).    Has gained 20+lbs in a few months, unexpected.  ?early satiety, feels full sooner.  CT done [facility] - \"vaginal pessary\" noted - rad corrected report \"gas\".  Upper abdomen firm, always gas, worse after " eating.     x 2, biggest 9#3.    Additional OB/GYN History   Last Pap : 2022 Negative, HPV negative  Last Completed Pap Smear          PAP SMEAR (Every 3 Years) Next due on 2022  LIQUID-BASED PAP SMEAR, [facility] ([facility])    2021  SCANNED - PAP SMEAR    2019  Done - negative, HPV negative              History of abnormal Pap smear: yes - 20 + years ago had a colpo and cryo, then cleared.  Tobacco Usage?: No       Current Outpatient Medications:   •  antihemophilic factor-vwf (HUMATE-P) 250-600 units injection, Infuse  into a venous catheter., Disp: , Rfl:   •  hydroCHLOROthiazide (HYDRODIURIL) 12.5 MG tablet, Take 1 tablet by mouth Daily., Disp: , Rfl:   •  Iron, Ferrous Sulfate, 325 (65 Fe) MG tablet, , Disp: , Rfl:   •  Multiple Vitamins-Minerals (MULTIVITAMIN ADULT EXTRA C PO), , Disp: , Rfl:   •  Tranexamic Acid 650 MG tablet, Take 2 tablets by mouth., Disp: , Rfl:   •  amLODIPine (NORVASC) 5 MG tablet, Take 1 tablet by mouth Every 12 (Twelve) Hours., Disp: , Rfl:   •  omeprazole (priLOSEC) 20 MG capsule, Take 20 mg by mouth 2 (Two) Times a Day., Disp: , Rfl:     Current Facility-Administered Medications:   •  Levonorgestrel (MIRENA) 20 MCG/DAY IUD intrauterine device 1 each, 1 each, Intrauterine, Once, Tod, Michelle Alma, APRN     Past Medical History:   Diagnosis Date   • Bleeding disorder     Von Willibrand's, Factor 8, Factor 12   • Cervical dysplasia    • Factor VIII (functional) deficiency    • Factor XII deficiency    • History of abnormal cervical Papanicolaou smear     led to cryo   • History of abnormal uterine bleeding    • History of vertigo    • Irritable bowel syndrome    • Ovarian cyst    • Pelvic prolapse    • PMS (premenstrual syndrome)    • Von Willebrand disease     diagnosed at age 6   • Wears glasses     reading and driving         Past Surgical History:   Procedure Laterality Date   • BREAST BIOPSY Right 2008    x 2   • BREAST EXCISIONAL  "BIOPSY Right 2007    x 2   • BREAST LUMPECTOMY Right 2008    BENIGN   • COLPOSCOPY  2005   • ENDOSCOPIC FUNCTIONAL SINUS SURGERY (FESS) Bilateral 1/8/2019    Procedure: BILATERAL TOTAL ETHMOIDECTOMY,BILATERAL FRONTAL SINUPLASTY WITH TISSUE REMOVAL, BILATERAL MAXILLARY SINUPLASTY WITH TISSUE REMOVAL;  Surgeon: Jose Camacho MD;  Location: [facility];  Service: ENT   • FERTILITY SURGERY      1 round IUI with Jules   • GYNECOLOGIC CRYOSURGERY  11/2005   • HERNIA REPAIR      age 6   • INGUINAL HERNIA REPAIR  1981   • URETHERAL RE-IMPLANTATION  AGE 4   • WISDOM TOOTH EXTRACTION         The additional following portions of the patient's history were reviewed and updated as appropriate: allergies, current medications, past family history, past medical history, past social history, past surgical history and problem list.    Review of Systems    I have reviewed and agree with the HPI, ROS, and historical information as entered above. Ana Carias MD    Objective   /84   Ht 175.3 cm (69.02\")   Wt 91.7 kg (202 lb 3.2 oz)   LMP 02/01/2023 (Approximate)   BMI 29.85 kg/m²     Physical Exam  Physical Exam:  General:  well developed; well nourished  no acute distress  obese - Body mass index is 29.85 kg/m².   Abdomen: soft, non-tender; no masses  no umbilical or inguinal hernias are present   Pelvis: Clinical staff was present for exam  External genitalia:  normal appearance of the external genitalia including Bartholin's and [place]'s glands.  :  urethral meatus normal;  Vaginal:  normal pink mucosa without prolapse or lesions.  Cervix:  normal appearance.  Uterus:  normal size, shape and consistency.  Adnexa:  normal bimanual exam of the adnexa.  Rectocele GRADE 3     Assessment & Plan     Assessment     Problem List Items Addressed This Visit     Abnormal uterine bleeding (AUB)    Rectocele - Primary   Other Visit Diagnoses     Bloating                Plan     1. Discussed options both medical and " surgical.  Discussed both risks/benefits of Novasure ablation vs. Hysterectomy.  We especially discussed risks of bleeding considering her bleeding diathesis.  She assures me that Dr. Yulisa Stoner, her hematologist at  states she can have the surgery and she is happy to make recommendations regarding surgical planning.  Considering her symptomatic rectocele, she really is better off doing the hysterectomy.  Plan TLH/BS+/-O and posterior repair with perineal repair.  2. Will ask patient to f/u for ultrasound considering bloating, weight gain symptoms.  Return in about 3 months (around 6/21/2023) for NEEDS SURGERY SCHEDULED, WITH SONO.        Ana Carias MD  03/21/2023

## 2023-04-02 PROBLEM — N81.6 RECTOCELE: Status: ACTIVE | Noted: 2023-04-02

## 2023-04-17 DIAGNOSIS — N93.9 ABNORMAL UTERINE BLEEDING (AUB): Primary | ICD-10-CM

## 2023-04-19 ENCOUNTER — OFFICE VISIT (OUTPATIENT)
Dept: OBSTETRICS AND GYNECOLOGY | Facility: CLINIC | Age: 48
End: 2023-04-19
Payer: COMMERCIAL

## 2023-04-19 VITALS
DIASTOLIC BLOOD PRESSURE: 70 MMHG | SYSTOLIC BLOOD PRESSURE: 120 MMHG | WEIGHT: 201 LBS | BODY MASS INDEX: 29.77 KG/M2 | HEIGHT: 69 IN

## 2023-04-19 DIAGNOSIS — D68.00 VON WILLEBRAND DISEASE: ICD-10-CM

## 2023-04-19 DIAGNOSIS — N93.9 ABNORMAL UTERINE BLEEDING (AUB): ICD-10-CM

## 2023-04-19 DIAGNOSIS — N64.4 BREAST PAIN, RIGHT: Primary | ICD-10-CM

## 2023-04-19 DIAGNOSIS — N81.6 RECTOCELE: ICD-10-CM

## 2023-04-19 DIAGNOSIS — N92.0 MENORRHAGIA WITH REGULAR CYCLE: ICD-10-CM

## 2023-04-19 DIAGNOSIS — N63.13 MASS OF LOWER OUTER QUADRANT OF RIGHT BREAST: ICD-10-CM

## 2023-04-19 RX ORDER — ESTRADIOL 0.1 MG/G
CREAM VAGINAL
Qty: 1 EACH | Refills: 12 | Status: SHIPPED | OUTPATIENT
Start: 2023-04-19

## 2023-04-19 RX ORDER — ETHYNODIOL DIACETATE AND ETHINYL ESTRADIOL 1 MG-35MCG
1 KIT ORAL DAILY
Qty: 28 TABLET | Refills: 12 | Status: SHIPPED | OUTPATIENT
Start: 2023-04-19 | End: 2024-04-18

## 2023-04-19 NOTE — PROGRESS NOTES
Chief Complaint   Patient presents with   • Follow-up     AUB       Subjective   HPI  Lisa Martin is a 47 y.o. female, . Her last LMP was Patient's last menstrual period was 2023 (approximate).. who presents for follow up on menorrhagia.    Since her last visit she has been bleeding heavily for 4 weeks. She states she is passing large clots and has some slight dysmenorrhea. She has been seen by Dr Yulisa Stoner @  Hematology and will follow up with her before [facility] in .    Also, wanting to see what she should do about right axillary discomfort.  Area is enlarged since beginning of last year.  It was evaluated with imaging and felt to be benign breast tissue.  However, it does bother her.      Additional OB/GYN History        Last Completed Pap Smear          PAP SMEAR (Every 3 Years) Next due on 2022  LIQUID-BASED PAP SMEAR, [facility] ([facility])    2021  SCANNED - PAP SMEAR    2019  Done - negative, HPV negative                   Last Completed Mammogram          Ordered - MAMMOGRAM (Yearly) Ordered on 2022  Mammo Screening Digital Tomosynthesis Bilateral With CAD    2021  Mammo Diagnostic Digital Tomosynthesis Bilateral With CAD    2020  Mammo Screening Digital Tomosynthesis Bilateral With CAD    10/19/2018  Mammo Screening Digital Tomosynthesis Bilateral With CAD    10/18/2017  Mammo Screening Digital Tomosynthesis Bilateral With CAD                Tobacco Usage?: No   OB History        2    Para   2    Term   2       0    AB   0    Living   2       SAB   0    IAB   0    Ectopic   0    Molar   0    Multiple   0    Live Births   2                  Current Outpatient Medications:   •  antihemophilic factor-vwf (HUMATE-P) 250-600 units injection, Infuse  into a venous catheter., Disp: , Rfl:   •  estradiol (ESTRACE VAGINAL) 0.1 MG/GM vaginal cream, Insert 1 gm intravaginally 2 times each week  for 3wk and then weekly thereafter, Disp: 1 each, Rfl: 12  •  ethynodiol-ethinyl estradiol (KELNOR,ZOVIA) 1-35 MG-MCG per tablet, Take 1 tablet by mouth Daily., Disp: 28 tablet, Rfl: 12  •  hydroCHLOROthiazide (HYDRODIURIL) 12.5 MG tablet, Take 1 tablet by mouth Daily., Disp: , Rfl:   •  Iron, Ferrous Sulfate, 325 (65 Fe) MG tablet, , Disp: , Rfl:   •  Multiple Vitamins-Minerals (MULTIVITAMIN ADULT EXTRA C PO), , Disp: , Rfl:   •  Tranexamic Acid 650 MG tablet, Take 2 tablets by mouth., Disp: , Rfl:     Current Facility-Administered Medications:   •  Levonorgestrel (MIRENA) 20 MCG/DAY IUD intrauterine device 1 each, 1 each, Intrauterine, Once, Michelle Baron, APRN     Past Medical History:   Diagnosis Date   • Bleeding disorder 1982    Von Willibrand's, Factor 8, Factor 12   • Cervical dysplasia    • Factor VIII (functional) deficiency    • Factor XII deficiency    • History of abnormal cervical Papanicolaou smear 2005    led to cryo   • History of abnormal uterine bleeding    • History of vertigo    • Irritable bowel syndrome    • Ovarian cyst    • Pelvic prolapse    • PMS (premenstrual syndrome)    • Von Willebrand disease     diagnosed at age 6   • Wears glasses     reading and driving         Past Surgical History:   Procedure Laterality Date   • BREAST BIOPSY Right 2008    x 2   • BREAST EXCISIONAL BIOPSY Right 2007    x 2   • BREAST LUMPECTOMY Right 2008    BENIGN   • COLPOSCOPY  2005   • ENDOSCOPIC FUNCTIONAL SINUS SURGERY (FESS) Bilateral 1/8/2019    Procedure: BILATERAL TOTAL ETHMOIDECTOMY,BILATERAL FRONTAL SINUPLASTY WITH TISSUE REMOVAL, BILATERAL MAXILLARY SINUPLASTY WITH TISSUE REMOVAL;  Surgeon: Jose Camacho MD;  Location: [facility];  Service: ENT   • FERTILITY SURGERY      1 round IUI with Jules   • GYNECOLOGIC CRYOSURGERY  11/2005   • HERNIA REPAIR      age 6   • INGUINAL HERNIA REPAIR  1981   • URETHERAL RE-IMPLANTATION  AGE 4   • WISDOM TOOTH EXTRACTION         The additional following  "portions of the patient's history were reviewed and updated as appropriate: current medications, past family history, past medical history, past social history, past surgical history and problem list.    Review of Systems   Genitourinary: Positive for menstrual problem.   All other systems reviewed and are negative.      I have reviewed and agree with the HPI, ROS, and historical information as entered above. Ana Carias MD    Objective   /70   Ht 175.3 cm (69\")   Wt 91.2 kg (201 lb)   LMP 03/22/2023 (Approximate)   BMI 29.68 kg/m²     Physical Exam     Physical Exam:  General:  well developed; well nourished  no acute distress   Abdomen: soft, non-tender; no masses  no umbilical or inguinal hernias are present   Pelvis: Not performed. today       Assessment & Plan     Assessment     Problem List Items Addressed This Visit     Von Willebrand disease    Relevant Medications    Tranexamic Acid 650 MG tablet    antihemophilic factor-vwf (HUMATE-P) 250-600 units injection    Breast lump    Abnormal uterine bleeding (AUB)    Rectocele    Breast pain, right - Primary    Relevant Orders    Ambulatory Referral to General Surgery (Completed)    RESOLVED: Menorrhagia with regular cycle    Overview     Day 2 through 4            Plan     1. Plan OCPs to try and stop DUB until she can have hyst.  If bleeding does not dramatically decrease/stop in the next couple weeks, she will call again.    2. Has pending labs with Dr. Stoner.  Need to coordinate care re: hysterectomy.  Also, has dental surgery planned prior.  3. Axillary pain - referral to breast surgeon  4. Estrace cream preop for posterior repair healing.  5. Return for preop appt.  Discussed BSO vs. Ovarian conservation at time of hysterectomy.        Ana Carias MD  04/19/2023  "

## 2023-05-10 ENCOUNTER — TELEPHONE (OUTPATIENT)
Dept: OBSTETRICS AND GYNECOLOGY | Facility: CLINIC | Age: 48
End: 2023-05-10
Payer: COMMERCIAL

## 2023-05-10 NOTE — TELEPHONE ENCOUNTER
I would not do Lysteda and OCPs at the same time.  Unfortunately, it can take 2-3mo to kind of regulate cycles with OCPs.  She can try to take 2 pills/day x 3 days and then one each day thereafter to see if bleeding calms down.  Once she reaches placebo pills, that period should be lighter and then I expect bleeding to stop.  As long as no signs getting too anemic, I think keep taking.  This is just evidence for why we are doing hysterectomy.    Of note, I did call Danitza Stoner's office at [facility].  They are awaiting some lab results and then they will send perioperative recommendations.  Thanks!

## 2023-05-10 NOTE — TELEPHONE ENCOUNTER
Per K[facility]- I would not do Lysteda and OCPs at the same time.  Unfortunately, it can take 2-3mo to kind of regulate cycles with OCPs.  She can try to take 2 pills/day x 3 days and then one each day thereafter to see if bleeding calms down.  Once she reaches placebo pills, that period should be lighter and then I expect bleeding to stop.  As long as no signs getting too anemic, I think keep taking.  This is just evidence for why we are doing hysterectomy.    Of note, I did call Danitza Stoner's office at [facility].  They are awaiting some lab results and then they will send perioperative recommendations.  Thanks!    Informed patient of this and she v/u.     Patient with questions regarding insurance coverage for surgery. Informed patient that I will send our surgery coordinator a message about this. She v/u.    Patient with questions regarding referral to Dr. Payton. Informed patient that I will send our referral coordinator a message about this. She v/u.

## 2023-05-10 NOTE — TELEPHONE ENCOUNTER
[facility] patient  Scheduled for TLH- BSO with posterior repair and perineal repair with [facility] on 06/16/2023    Patient started on ethynodiol-ethinyl estradiol (KELNOR,ZOVIA) 1-35 MG-MCG on 04/19/2023. Patient states that she is still having consistent vaginal bleeding since beginning OCPs. Patient reports changing 1 ultra tampon every 1 hour on her heaviest day of flow. Patient also reports large vaginal blood clots that are the size of a half dollar. Patient reports mild pelvic cramping on OCPs. Patient has not tried the Lysteda for vaginal bleeding. Patient denies missing any OCPs and reports taking them at the same time each day. Patient states that she has not tried the vaginal estradiol cream d/t heavy vaginal bleeding. Patient is taking iron supplement. Patient also reports fatigue. Patient denies lightheadedness, dizziness, or heart palpitations.

## 2023-05-12 NOTE — TELEPHONE ENCOUNTER
Spoke to patient about prior auth with insurance and how the request has been submitted. Pt v/u and states she has no other questions regarding sx.

## 2023-06-12 ENCOUNTER — PREP FOR SURGERY (OUTPATIENT)
Dept: OBSTETRICS AND GYNECOLOGY | Facility: CLINIC | Age: 48
End: 2023-06-12

## 2023-06-12 ENCOUNTER — PRE-ADMISSION TESTING (OUTPATIENT)
Dept: PREADMISSION TESTING | Facility: HOSPITAL | Age: 48
End: 2023-06-12
Payer: COMMERCIAL

## 2023-06-12 ENCOUNTER — OFFICE VISIT (OUTPATIENT)
Dept: OBSTETRICS AND GYNECOLOGY | Facility: CLINIC | Age: 48
End: 2023-06-12
Payer: COMMERCIAL

## 2023-06-12 VITALS
HEIGHT: 69 IN | BODY MASS INDEX: 29.68 KG/M2 | WEIGHT: 200.4 LBS | SYSTOLIC BLOOD PRESSURE: 124 MMHG | DIASTOLIC BLOOD PRESSURE: 86 MMHG

## 2023-06-12 VITALS — WEIGHT: 200.84 LBS | BODY MASS INDEX: 29.75 KG/M2 | HEIGHT: 69 IN

## 2023-06-12 DIAGNOSIS — N93.9 ABNORMAL UTERINE BLEEDING (AUB): Primary | ICD-10-CM

## 2023-06-12 DIAGNOSIS — N93.9 ABNORMAL UTERINE BLEEDING (AUB): ICD-10-CM

## 2023-06-12 DIAGNOSIS — N81.6 RECTOCELE: ICD-10-CM

## 2023-06-12 DIAGNOSIS — D68.00 VON WILLEBRAND DISEASE: ICD-10-CM

## 2023-06-12 LAB
ABO GROUP BLD: NORMAL
ANION GAP SERPL CALCULATED.3IONS-SCNC: 10 MMOL/L (ref 5–15)
BASOPHILS # BLD AUTO: 0.06 10*3/MM3 (ref 0–0.2)
BASOPHILS NFR BLD AUTO: 0.6 % (ref 0–1.5)
BUN SERPL-MCNC: 13 MG/DL (ref 6–20)
BUN/CREAT SERPL: 18.3 (ref 7–25)
CALCIUM SPEC-SCNC: 8.9 MG/DL (ref 8.6–10.5)
CHLORIDE SERPL-SCNC: 103 MMOL/L (ref 98–107)
CO2 SERPL-SCNC: 23 MMOL/L (ref 22–29)
CREAT SERPL-MCNC: 0.71 MG/DL (ref 0.57–1)
DEPRECATED RDW RBC AUTO: 40.1 FL (ref 37–54)
EGFRCR SERPLBLD CKD-EPI 2021: 105.7 ML/MIN/1.73
EOSINOPHIL # BLD AUTO: 0.18 10*3/MM3 (ref 0–0.4)
EOSINOPHIL NFR BLD AUTO: 1.9 % (ref 0.3–6.2)
ERYTHROCYTE [DISTWIDTH] IN BLOOD BY AUTOMATED COUNT: 13.9 % (ref 12.3–15.4)
GLUCOSE SERPL-MCNC: 87 MG/DL (ref 65–99)
HBA1C MFR BLD: 5.7 % (ref 4.8–5.6)
HCT VFR BLD AUTO: 31 % (ref 34–46.6)
HGB BLD-MCNC: 9.1 G/DL (ref 12–15.9)
IMM GRANULOCYTES # BLD AUTO: 0.03 10*3/MM3 (ref 0–0.05)
IMM GRANULOCYTES NFR BLD AUTO: 0.3 % (ref 0–0.5)
LYMPHOCYTES # BLD AUTO: 1.99 10*3/MM3 (ref 0.7–3.1)
LYMPHOCYTES NFR BLD AUTO: 20.5 % (ref 19.6–45.3)
MCH RBC QN AUTO: 23.2 PG (ref 26.6–33)
MCHC RBC AUTO-ENTMCNC: 29.4 G/DL (ref 31.5–35.7)
MCV RBC AUTO: 79.1 FL (ref 79–97)
MONOCYTES # BLD AUTO: 0.71 10*3/MM3 (ref 0.1–0.9)
MONOCYTES NFR BLD AUTO: 7.3 % (ref 5–12)
NEUTROPHILS NFR BLD AUTO: 6.74 10*3/MM3 (ref 1.7–7)
NEUTROPHILS NFR BLD AUTO: 69.4 % (ref 42.7–76)
NRBC BLD AUTO-RTO: 0 /100 WBC (ref 0–0.2)
PLATELET # BLD AUTO: 393 10*3/MM3 (ref 140–450)
PMV BLD AUTO: 10.8 FL (ref 6–12)
POTASSIUM SERPL-SCNC: 4 MMOL/L (ref 3.5–5.2)
RBC # BLD AUTO: 3.92 10*6/MM3 (ref 3.77–5.28)
RH BLD: POSITIVE
SODIUM SERPL-SCNC: 136 MMOL/L (ref 136–145)
WBC NRBC COR # BLD: 9.71 10*3/MM3 (ref 3.4–10.8)

## 2023-06-12 PROCEDURE — 99213 OFFICE O/P EST LOW 20 MIN: CPT | Performed by: OBSTETRICS & GYNECOLOGY

## 2023-06-12 PROCEDURE — 36415 COLL VENOUS BLD VENIPUNCTURE: CPT

## 2023-06-12 PROCEDURE — 80048 BASIC METABOLIC PNL TOTAL CA: CPT

## 2023-06-12 PROCEDURE — 86901 BLOOD TYPING SEROLOGIC RH(D): CPT | Performed by: OBSTETRICS & GYNECOLOGY

## 2023-06-12 PROCEDURE — 83036 HEMOGLOBIN GLYCOSYLATED A1C: CPT

## 2023-06-12 PROCEDURE — 85025 COMPLETE CBC W/AUTO DIFF WBC: CPT

## 2023-06-12 PROCEDURE — 86900 BLOOD TYPING SEROLOGIC ABO: CPT | Performed by: OBSTETRICS & GYNECOLOGY

## 2023-06-12 RX ORDER — SODIUM CHLORIDE 0.9 % (FLUSH) 0.9 %
10 SYRINGE (ML) INJECTION AS NEEDED
Status: CANCELLED | OUTPATIENT
Start: 2023-06-12

## 2023-06-12 RX ORDER — SODIUM CHLORIDE 9 MG/ML
40 INJECTION, SOLUTION INTRAVENOUS AS NEEDED
Status: CANCELLED | OUTPATIENT
Start: 2023-06-12

## 2023-06-12 RX ORDER — SODIUM CHLORIDE 0.9 % (FLUSH) 0.9 %
3 SYRINGE (ML) INJECTION EVERY 12 HOURS SCHEDULED
Status: CANCELLED | OUTPATIENT
Start: 2023-06-12

## 2023-06-12 RX ORDER — ACETAMINOPHEN 500 MG
1000 TABLET ORAL ONCE
Status: CANCELLED | OUTPATIENT
Start: 2023-06-12 | End: 2023-06-12

## 2023-06-12 RX ORDER — SCOLOPAMINE TRANSDERMAL SYSTEM 1 MG/1
1 PATCH, EXTENDED RELEASE TRANSDERMAL CONTINUOUS
Status: CANCELLED | OUTPATIENT
Start: 2023-06-12 | End: 2023-06-15

## 2023-06-12 RX ORDER — SODIUM CHLORIDE, SODIUM LACTATE, POTASSIUM CHLORIDE, CALCIUM CHLORIDE 600; 310; 30; 20 MG/100ML; MG/100ML; MG/100ML; MG/100ML
125 INJECTION, SOLUTION INTRAVENOUS CONTINUOUS
Status: CANCELLED | OUTPATIENT
Start: 2023-06-12

## 2023-06-12 RX ORDER — PHENAZOPYRIDINE HYDROCHLORIDE 100 MG/1
200 TABLET, FILM COATED ORAL ONCE
Status: CANCELLED | OUTPATIENT
Start: 2023-06-12 | End: 2023-06-12

## 2023-06-12 RX ORDER — GABAPENTIN 100 MG/1
600 CAPSULE ORAL ONCE
Status: CANCELLED | OUTPATIENT
Start: 2023-06-12 | End: 2023-06-12

## 2023-06-12 NOTE — PAT
Too early to draw type and screen in PAT.  Please obtain blood bank specimen in pre-op on the day of surgery.    Patient viewed general PAT education video as instructed in their preoperative information received from their surgeon.  Patient stated the general PAT education video was viewed in its entirety and survey completed.  Copies of PAT general education handouts (Incentive Spirometry, Meds to Beds Program, Patient Belongings, Pre-op skin preparation instructions, Blood Glucose testing, Visitor policy, Surgery FAQ, Code H) distributed to patient if not printed. Education related to the PAT pass and skin preparation for surgery (if applicable) completed in PAT as a reinforcement to PAT education video. Patient instructed to return PAT pass provided today as well as completed skin preparation sheet (if applicable) on the day of procedure.     Additionally if patient had not viewed video yet but intended to view it at home or in our waiting area, then referred them to the handout with QR code/link provided during PAT visit.  Instructed patient to complete survey after viewing the video in its entirety.  Encouraged patient/family to read PAT general education handouts thoroughly and notify PAT staff with any questions or concerns. Patient verbalized understanding of all information and priority content.    Patient instructed to drink 20 ounces of Gatorade and it needs to be completed 1 hour (for Main OR patients) or 2 hours (scheduled  section & [facility] patients) before given arrival time for procedure (NO RED Gatorade)    Patient verbalized understanding.    Patient to apply Chlorhexadine wipes  to surgical area (as instructed) the night before procedure and the AM of procedure. Wipes provided.      
Additional Notes: Discussed with patient it could be PIH from the sugar scrub or vitiligo of the lip and how vitiligo is not a genetic trait.
Detail Level: Simple
Render Risk Assessment In Note?: no

## 2023-06-12 NOTE — PROGRESS NOTES
Gynecologic Preoperative Exam Note        Subjective   Lisa Martin is a 47 y.o. year old  who is scheduled for total laparoscopic hysterectomy, bilateral salpingoophorectomy and posterior repair, cystoscopy at [facility] on 23 at 12:45PM. Pre Admission testing has been scheduled for 2023  at [facility]. Her pre operative diagnosis is  AUB . She does not need to see her PCP for preop clearance for this surgery. Patient's last menstrual period was 2023.. Her birth control method is OCP.  Her BMI is Body mass index is 29.58 kg/m²..      She has reviewed the informational video on 2023.    She has reviewed the informational pamphlet on 2023.    She understands the risks of bleeding, infection, possible damage to other organ systems, including but not limited to the gastrointestinal tract and genitourinary tract.  She also understands the specific risks listed in the preop information (video, pamphlets, etc.).    She has reviewed and signed the preop consent form.    She has been instructed to have a light dinner the night before surgery, then nothing to eat or drink after midnight.  The day of surgery do not chew gum or smoke.  Remove all jewelry, nail polish, contact lenses prior to coming to the hospital.  Do not bring valuables or large sums of money with you. Patient was instructed on what time to arrive and where to check in, maps were given.  She was instructed that she will meet an Anesthesiologist and that an IV will be started to provide fluids and sedation.  The total time of procedure was discussed.  She was instructed that she will need a .      Recommendations per Danitza Stoner DNP ( [facility]): No special prep for surgery.  If bleeding, consider platelet transfusion.  If not enough, consider factor 7.    Allergies   Allergen Reactions    Aspirin Other (See Comments)     Pt has Von Willebrand disease      Ibuprofen Other (See Comments)     Pt has Von Willebrand disease      She has confirmed that she is not allergic to Latex.     She is on the following medications. These were reviewed with the patient today and instructed on which medications are ok to take with a sip of water prior to the surgery.      Current Outpatient Medications:     estradiol (ESTRACE VAGINAL) 0.1 MG/GM vaginal cream, Insert 1 gm intravaginally 2 times each week for 3wk and then weekly thereafter, Disp: 1 each, Rfl: 12    ethynodiol-ethinyl estradiol (KELNOR,ZOVIA) 1-35 MG-MCG per tablet, Take 1 tablet by mouth Daily., Disp: 28 tablet, Rfl: 12    antihemophilic factor-vwf (HUMATE-P) 250-600 units injection, Infuse  into a venous catheter. (Patient not taking: Reported on 6/12/2023), Disp: , Rfl:     hydroCHLOROthiazide (HYDRODIURIL) 12.5 MG tablet, Take 1 tablet by mouth Daily. 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     History of abnormal uterine bleeding     History of vertigo     Irritable bowel syndrome     Ovarian cyst     Pelvic prolapse     PMS (premenstrual syndrome)     Von Willebrand disease     diagnosed at age 6    Wears glasses     reading and driving      Past Surgical History:   Procedure Laterality Date    INGUINAL HERNIA REPAIR  1981    COLPOSCOPY  2005    GYNECOLOGIC CRYOSURGERY  11/2005    BREAST EXCISIONAL BIOPSY Right 2007    x 2     BREAST LUMPECTOMY Right     BENIGN    BREAST BIOPSY Right 2008    x 2    ENDOSCOPIC FUNCTIONAL SINUS SURGERY (FESS) Bilateral 2019    Procedure: BILATERAL TOTAL ETHMOIDECTOMY,BILATERAL FRONTAL SINUPLASTY WITH TISSUE REMOVAL, BILATERAL MAXILLARY SINUPLASTY WITH TISSUE REMOVAL;  Surgeon: Jose Camacho MD;  Location: [facility];  Service: ENT    FERTILITY SURGERY      1 round IUI with Akin    HERNIA REPAIR      age 6    URETHERAL RE-IMPLANTATION  AGE 4    WISDOM TOOTH EXTRACTION       OB History    Para Term  AB Living   2 2 2 0 0 2   SAB IAB Ectopic Molar Multiple Live Births   0 0 0 0 0 2      # Outcome Date GA Lbr Se/2nd Weight Sex Delivery Anes PTL Lv   2 Term 01/05/15 39w0d  4111 g (9 lb 1 oz) F Vaginal unsp EPI  JAMES   1 Term 13 40w0d  3487 g (7 lb 11 oz) F Vaginal unsp EPI  JAMES     Social History     Tobacco Use   Smoking Status Never   Smokeless Tobacco Never     Social History     Substance and Sexual Activity   Alcohol Use Not Currently    Comment: 0-1x monthly     Social History     Substance and Sexual Activity   Drug Use No         Review of Systems   All other systems reviewed and negative.          Objective    Vitals:    23 0913   BP: 124/86         Physical Exam    Physical Exam:  General:  well developed; well nourished  no acute distress   Abdomen: soft, non-tender; no masses  no umbilical or inguinal hernias are present  no hepato-splenomegaly   Pelvis: Clinical staff was present for exam  External genitalia:  normal appearance of the external genitalia including Bartholin's and [place]'s glands.  There is <1cm cyst of right labia minora  :  urethral meatus normal;  Vaginal:  normal pink mucosa without prolapse or lesions.  Rectocele GRADE 3  Perineal defect noted        Assessment   Problem List Items Addressed This Visit       Von Willebrand disease    Relevant Medications    antihemophilic factor-vwf (HUMATE-P) 250-600 units injection    Abnormal uterine  bleeding (AUB) - Primary    Rectocele            Plan   Total laparoscopic hysterectomy with bilateral salpingo-oophorectomy, posterior repair and perineorrhaphy, cystoscopy.  Recommendations per Danitza Stoner DNP ([facility] [place]): No special prep for surgery.  If bleeding, consider TXA, then platelet transfusion.  If not enough, consider factor 7.  Risks of surgery were reviewed with the patient including risks of bleeding, infection, damage to other organ systems including, but not limited to GI and  tracts (bowel, bladder, blood vessels, nerves) risks of Anesthesia, as well as the risk the surgery will not produce the desired results, possible need for additional surgery, death, risk of uterine perforation.  PAT Scheduled    Simón has been obtained and reviewed   Pain Medication Consent Form has been signed.  A review regarding proper medication administration, impact on driving and working while medicated, the safety of use in pregnancy, the potential for overdose and the proper disposal and storage of controlled medications has been done with the patient.        Ana Carias MD  6/12/2023

## 2023-06-12 NOTE — H&P (VIEW-ONLY)
Gynecologic Preoperative Exam Note        Subjective   Lisa Martin is a 47 y.o. year old  who is scheduled for total laparoscopic hysterectomy, bilateral salpingoophorectomy and posterior repair, cystoscopy at [facility] on 23 at 12:45PM. Pre Admission testing has been scheduled for 2023  at [facility]. Her pre operative diagnosis is  AUB . She does not need to see her PCP for preop clearance for this surgery. Patient's last menstrual period was 2023.. Her birth control method is OCP.  Her BMI is Body mass index is 29.58 kg/m²..      She has reviewed the informational video on 2023.    She has reviewed the informational pamphlet on 2023.    She understands the risks of bleeding, infection, possible damage to other organ systems, including but not limited to the gastrointestinal tract and genitourinary tract.  She also understands the specific risks listed in the preop information (video, pamphlets, etc.).    She has reviewed and signed the preop consent form.    She has been instructed to have a light dinner the night before surgery, then nothing to eat or drink after midnight.  The day of surgery do not chew gum or smoke.  Remove all jewelry, nail polish, contact lenses prior to coming to the hospital.  Do not bring valuables or large sums of money with you. Patient was instructed on what time to arrive and where to check in, maps were given.  She was instructed that she will meet an Anesthesiologist and that an IV will be started to provide fluids and sedation.  The total time of procedure was discussed.  She was instructed that she will need a .      Recommendations per Danitza Stoner DNP ( Hemophelia treatment center): No special prep for surgery.  If bleeding, consider platelet transfusion.  If not enough, consider factor 7.    Allergies   Allergen Reactions    Aspirin Other (See Comments)     Pt has Von Willebrand disease      Ibuprofen Other (See Comments)     Pt has Von Willebrand disease      She has confirmed that she is not allergic to Latex.     She is on the following medications. These were reviewed with the patient today and instructed on which medications are ok to take with a sip of water prior to the surgery.      Current Outpatient Medications:     estradiol (ESTRACE VAGINAL) 0.1 MG/GM vaginal cream, Insert 1 gm intravaginally 2 times each week for 3wk and then weekly thereafter, Disp: 1 each, Rfl: 12    ethynodiol-ethinyl estradiol (KELNOR,ZOVIA) 1-35 MG-MCG per tablet, Take 1 tablet by mouth Daily., Disp: 28 tablet, Rfl: 12    antihemophilic factor-vwf (HUMATE-P) 250-600 units injection, Infuse  into a venous catheter. (Patient not taking: Reported on 6/12/2023), Disp: , Rfl:     hydroCHLOROthiazide (HYDRODIURIL) 12.5 MG tablet, Take 1 tablet by mouth Daily. 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     History of abnormal uterine bleeding     History of vertigo     Irritable bowel syndrome     Ovarian cyst     Pelvic prolapse     PMS (premenstrual syndrome)     Von Willebrand disease     diagnosed at age 6    Wears glasses     reading and driving      Past Surgical History:   Procedure Laterality Date    INGUINAL HERNIA REPAIR  1981    COLPOSCOPY  2005    GYNECOLOGIC CRYOSURGERY  11/2005    BREAST EXCISIONAL BIOPSY Right 2007    x 2     BREAST LUMPECTOMY Right     BENIGN    BREAST BIOPSY Right 2008    x 2    ENDOSCOPIC FUNCTIONAL SINUS SURGERY (FESS) Bilateral 2019    Procedure: BILATERAL TOTAL ETHMOIDECTOMY,BILATERAL FRONTAL SINUPLASTY WITH TISSUE REMOVAL, BILATERAL MAXILLARY SINUPLASTY WITH TISSUE REMOVAL;  Surgeon: Jose Camacho MD;  [facility]: [facility];  Service: ENT    FERTILITY SURGERY      1 round IUI with Akin    HERNIA REPAIR      age 6    URETHERAL RE-IMPLANTATION  AGE 4    WISDOM TOOTH EXTRACTION       OB History    Para Term  AB Living   2 2 2 0 0 2   SAB IAB Ectopic Molar Multiple Live Births   0 0 0 0 0 2      # Outcome Date GA Lbr Se/2nd Weight Sex Delivery Anes PTL Lv   2 Term 01/05/15 39w0d  4111 g (9 lb 1 oz) F Vaginal unsp EPI  JAMES   1 Term 13 40w0d  3487 g (7 lb 11 oz) F Vaginal unsp EPI  JAMES     Social History     Tobacco Use   Smoking Status Never   Smokeless Tobacco Never     Social History     Substance and Sexual Activity   Alcohol Use Not Currently    Comment: 0-1x monthly     Social History     Substance and Sexual Activity   Drug Use No         Review of Systems   All other systems reviewed and negative.          Objective    Vitals:    23 0913   BP: 124/86         Physical Exam    Physical Exam:  General:  well developed; well nourished  no acute distress   Abdomen: soft, non-tender; no masses  no umbilical or inguinal hernias are present  no hepato-splenomegaly   Pelvis: Clinical staff was present for exam  External genitalia:  normal appearance of the external genitalia including Bartholin's and [place]'s glands.  There is <1cm cyst of right labia minora  :  urethral meatus normal;  Vaginal:  normal pink mucosa without prolapse or lesions.  Rectocele GRADE 3  Perineal defect noted        Assessment   Problem List Items Addressed This Visit       Von Willebrand disease    Relevant Medications    antihemophilic factor-vwf (HUMATE-P) 250-600 units injection    Abnormal uterine  bleeding (AUB) - Primary    Rectocele            Plan   Total laparoscopic hysterectomy with bilateral salpingo-oophorectomy, posterior repair and perineorrhaphy, cystoscopy.  Recommendations per Danitza Stoner DNP ([facility] treatment [place]): No special prep for surgery.  If bleeding, consider TXA, then platelet transfusion.  If not enough, consider factor 7.  Risks of surgery were reviewed with the patient including risks of bleeding, infection, damage to other organ systems including, but not limited to GI and  tracts (bowel, bladder, blood vessels, nerves) risks of Anesthesia, as well as the risk the surgery will not produce the desired results, possible need for additional surgery, death, risk of uterine perforation.  PAT Scheduled    Simón has been obtained and reviewed   Pain Medication Consent Form has been signed.  A review regarding proper medication administration, impact on driving and working while medicated, the safety of use in pregnancy, the potential for overdose and the proper disposal and storage of controlled medications has been done with the patient.        Ana Carias MD  6/12/2023

## 2023-06-15 ENCOUNTER — ANESTHESIA EVENT (OUTPATIENT)
Dept: PERIOP | Facility: HOSPITAL | Age: 48
End: 2023-06-15
Payer: COMMERCIAL

## 2023-06-15 ENCOUNTER — PREP FOR SURGERY (OUTPATIENT)
Dept: UROLOGY | Facility: CLINIC | Age: 48
End: 2023-06-15
Payer: COMMERCIAL

## 2023-06-15 RX ORDER — SODIUM CHLORIDE 0.9 % (FLUSH) 0.9 %
10 SYRINGE (ML) INJECTION AS NEEDED
Status: CANCELLED | OUTPATIENT
Start: 2023-06-15

## 2023-06-15 RX ORDER — SODIUM CHLORIDE 9 MG/ML
40 INJECTION, SOLUTION INTRAVENOUS AS NEEDED
Status: CANCELLED | OUTPATIENT
Start: 2023-06-15

## 2023-06-15 RX ORDER — FAMOTIDINE 10 MG/ML
20 INJECTION, SOLUTION INTRAVENOUS ONCE
Status: CANCELLED | OUTPATIENT
Start: 2023-06-15 | End: 2023-06-15

## 2023-06-15 RX ORDER — SODIUM CHLORIDE 0.9 % (FLUSH) 0.9 %
10 SYRINGE (ML) INJECTION EVERY 12 HOURS SCHEDULED
Status: CANCELLED | OUTPATIENT
Start: 2023-06-15

## 2023-06-16 ENCOUNTER — HOSPITAL ENCOUNTER (OUTPATIENT)
Facility: HOSPITAL | Age: 48
Discharge: HOME | End: 2023-06-17
Attending: OBSTETRICS & GYNECOLOGY | Admitting: OBSTETRICS & GYNECOLOGY
Payer: COMMERCIAL

## 2023-06-16 ENCOUNTER — ANESTHESIA (OUTPATIENT)
Dept: PERIOP | Facility: HOSPITAL | Age: 48
End: 2023-06-16
Payer: COMMERCIAL

## 2023-06-16 DIAGNOSIS — N93.9 ABNORMAL UTERINE BLEEDING (AUB): ICD-10-CM

## 2023-06-16 LAB
ABO GROUP BLD: NORMAL
B-HCG UR QL: NEGATIVE
BLD GP AB SCN SERPL QL: NEGATIVE
EXPIRATION DATE: NORMAL
INTERNAL NEGATIVE CONTROL: NEGATIVE
INTERNAL POSITIVE CONTROL: POSITIVE
Lab: NORMAL
RH BLD: POSITIVE
T&S EXPIRATION DATE: NORMAL

## 2023-06-16 PROCEDURE — 81025 URINE PREGNANCY TEST: CPT | Performed by: ANESTHESIOLOGY

## 2023-06-16 PROCEDURE — 25010000002 PROPOFOL 10 MG/ML EMULSION

## 2023-06-16 PROCEDURE — 25010000002 CEFAZOLIN IN DEXTROSE 2-4 GM/100ML-% SOLUTION: Performed by: OBSTETRICS & GYNECOLOGY

## 2023-06-16 PROCEDURE — 86900 BLOOD TYPING SEROLOGIC ABO: CPT | Performed by: OBSTETRICS & GYNECOLOGY

## 2023-06-16 PROCEDURE — 25010000002 FENTANYL CITRATE (PF) 100 MCG/2ML SOLUTION

## 2023-06-16 PROCEDURE — 86901 BLOOD TYPING SEROLOGIC RH(D): CPT | Performed by: OBSTETRICS & GYNECOLOGY

## 2023-06-16 PROCEDURE — 86923 COMPATIBILITY TEST ELECTRIC: CPT

## 2023-06-16 PROCEDURE — 25010000002 DEXAMETHASONE PER 1 MG

## 2023-06-16 PROCEDURE — 25010000002 SUGAMMADEX 200 MG/2ML SOLUTION: Performed by: NURSE ANESTHETIST, CERTIFIED REGISTERED

## 2023-06-16 PROCEDURE — 25010000002 HYDROMORPHONE PER 4 MG: Performed by: NURSE ANESTHETIST, CERTIFIED REGISTERED

## 2023-06-16 PROCEDURE — 25010000002 ONDANSETRON PER 1 MG

## 2023-06-16 PROCEDURE — 86850 RBC ANTIBODY SCREEN: CPT | Performed by: OBSTETRICS & GYNECOLOGY

## 2023-06-16 RX ORDER — DEXAMETHASONE SODIUM PHOSPHATE 4 MG/ML
INJECTION, SOLUTION INTRA-ARTICULAR; INTRALESIONAL; INTRAMUSCULAR; INTRAVENOUS; SOFT TISSUE AS NEEDED
Status: DISCONTINUED | OUTPATIENT
Start: 2023-06-16 | End: 2023-06-16 | Stop reason: SURG

## 2023-06-16 RX ORDER — ROCURONIUM BROMIDE 10 MG/ML
INJECTION, SOLUTION INTRAVENOUS AS NEEDED
Status: DISCONTINUED | OUTPATIENT
Start: 2023-06-16 | End: 2023-06-16 | Stop reason: SURG

## 2023-06-16 RX ORDER — ONDANSETRON 2 MG/ML
4 INJECTION INTRAMUSCULAR; INTRAVENOUS ONCE AS NEEDED
Status: DISCONTINUED | OUTPATIENT
Start: 2023-06-16 | End: 2023-06-16 | Stop reason: HOSPADM

## 2023-06-16 RX ORDER — DROPERIDOL 2.5 MG/ML
0.62 INJECTION, SOLUTION INTRAMUSCULAR; INTRAVENOUS ONCE AS NEEDED
Status: DISCONTINUED | OUTPATIENT
Start: 2023-06-16 | End: 2023-06-16 | Stop reason: HOSPADM

## 2023-06-16 RX ORDER — FAMOTIDINE 20 MG/1
20 TABLET, FILM COATED ORAL ONCE
Status: COMPLETED | OUTPATIENT
Start: 2023-06-16 | End: 2023-06-16

## 2023-06-16 RX ORDER — SCOLOPAMINE TRANSDERMAL SYSTEM 1 MG/1
1 PATCH, EXTENDED RELEASE TRANSDERMAL CONTINUOUS
Status: DISCONTINUED | OUTPATIENT
Start: 2023-06-16 | End: 2023-06-17 | Stop reason: HOSPADM

## 2023-06-16 RX ORDER — BUPIVACAINE HYDROCHLORIDE AND EPINEPHRINE 2.5; 5 MG/ML; UG/ML
INJECTION, SOLUTION INFILTRATION; PERINEURAL AS NEEDED
Status: DISCONTINUED | OUTPATIENT
Start: 2023-06-16 | End: 2023-06-16 | Stop reason: HOSPADM

## 2023-06-16 RX ORDER — DIPHENHYDRAMINE HYDROCHLORIDE 50 MG/ML
25 INJECTION INTRAMUSCULAR; INTRAVENOUS NIGHTLY PRN
Status: DISCONTINUED | OUTPATIENT
Start: 2023-06-16 | End: 2023-06-17 | Stop reason: HOSPADM

## 2023-06-16 RX ORDER — DOCUSATE SODIUM 100 MG/1
100 CAPSULE, LIQUID FILLED ORAL 2 TIMES DAILY
Status: DISCONTINUED | OUTPATIENT
Start: 2023-06-16 | End: 2023-06-17 | Stop reason: HOSPADM

## 2023-06-16 RX ORDER — SODIUM CHLORIDE, SODIUM LACTATE, POTASSIUM CHLORIDE, CALCIUM CHLORIDE 600; 310; 30; 20 MG/100ML; MG/100ML; MG/100ML; MG/100ML
125 INJECTION, SOLUTION INTRAVENOUS CONTINUOUS
Status: DISCONTINUED | OUTPATIENT
Start: 2023-06-16 | End: 2023-06-17 | Stop reason: HOSPADM

## 2023-06-16 RX ORDER — ACETAMINOPHEN 500 MG
1000 TABLET ORAL ONCE
Status: COMPLETED | OUTPATIENT
Start: 2023-06-16 | End: 2023-06-16

## 2023-06-16 RX ORDER — MAGNESIUM HYDROXIDE 1200 MG/15ML
LIQUID ORAL AS NEEDED
Status: DISCONTINUED | OUTPATIENT
Start: 2023-06-16 | End: 2023-06-16 | Stop reason: HOSPADM

## 2023-06-16 RX ORDER — HYDROCODONE BITARTRATE AND ACETAMINOPHEN 10; 325 MG/1; MG/1
1 TABLET ORAL EVERY 4 HOURS PRN
Status: DISCONTINUED | OUTPATIENT
Start: 2023-06-16 | End: 2023-06-17 | Stop reason: HOSPADM

## 2023-06-16 RX ORDER — HYDROMORPHONE HCL 110MG/55ML
PATIENT CONTROLLED ANALGESIA SYRINGE INTRAVENOUS AS NEEDED
Status: DISCONTINUED | OUTPATIENT
Start: 2023-06-16 | End: 2023-06-16 | Stop reason: SURG

## 2023-06-16 RX ORDER — GABAPENTIN 300 MG/1
600 CAPSULE ORAL ONCE
Status: COMPLETED | OUTPATIENT
Start: 2023-06-16 | End: 2023-06-16

## 2023-06-16 RX ORDER — TRANEXAMIC ACID 10 MG/ML
1000 INJECTION, SOLUTION INTRAVENOUS ONCE
Status: COMPLETED | OUTPATIENT
Start: 2023-06-16 | End: 2023-06-16

## 2023-06-16 RX ORDER — DIPHENHYDRAMINE HCL 25 MG
25 CAPSULE ORAL NIGHTLY PRN
Status: DISCONTINUED | OUTPATIENT
Start: 2023-06-16 | End: 2023-06-17 | Stop reason: HOSPADM

## 2023-06-16 RX ORDER — DROPERIDOL 2.5 MG/ML
0.62 INJECTION, SOLUTION INTRAMUSCULAR; INTRAVENOUS
Status: DISCONTINUED | OUTPATIENT
Start: 2023-06-16 | End: 2023-06-16 | Stop reason: HOSPADM

## 2023-06-16 RX ORDER — LABETALOL HYDROCHLORIDE 5 MG/ML
5 INJECTION, SOLUTION INTRAVENOUS
Status: DISCONTINUED | OUTPATIENT
Start: 2023-06-16 | End: 2023-06-16 | Stop reason: HOSPADM

## 2023-06-16 RX ORDER — CEFAZOLIN SODIUM 2 G/100ML
2 INJECTION, SOLUTION INTRAVENOUS ONCE
Status: COMPLETED | OUTPATIENT
Start: 2023-06-16 | End: 2023-06-16

## 2023-06-16 RX ORDER — MEPERIDINE HYDROCHLORIDE 25 MG/ML
12.5 INJECTION INTRAMUSCULAR; INTRAVENOUS; SUBCUTANEOUS
Status: DISCONTINUED | OUTPATIENT
Start: 2023-06-16 | End: 2023-06-16 | Stop reason: HOSPADM

## 2023-06-16 RX ORDER — PROMETHAZINE HYDROCHLORIDE 25 MG/1
25 TABLET ORAL ONCE AS NEEDED
Status: DISCONTINUED | OUTPATIENT
Start: 2023-06-16 | End: 2023-06-16 | Stop reason: HOSPADM

## 2023-06-16 RX ORDER — LIDOCAINE HYDROCHLORIDE 10 MG/ML
0.5 INJECTION, SOLUTION EPIDURAL; INFILTRATION; INTRACAUDAL; PERINEURAL ONCE AS NEEDED
Status: COMPLETED | OUTPATIENT
Start: 2023-06-16 | End: 2023-06-16

## 2023-06-16 RX ORDER — SODIUM CHLORIDE, SODIUM LACTATE, POTASSIUM CHLORIDE, CALCIUM CHLORIDE 600; 310; 30; 20 MG/100ML; MG/100ML; MG/100ML; MG/100ML
9 INJECTION, SOLUTION INTRAVENOUS CONTINUOUS
Status: DISCONTINUED | OUTPATIENT
Start: 2023-06-16 | End: 2023-06-17 | Stop reason: HOSPADM

## 2023-06-16 RX ORDER — IBUPROFEN 600 MG/1
600 TABLET ORAL EVERY 8 HOURS SCHEDULED
Status: DISCONTINUED | OUTPATIENT
Start: 2023-06-16 | End: 2023-06-17 | Stop reason: HOSPADM

## 2023-06-16 RX ORDER — IPRATROPIUM BROMIDE AND ALBUTEROL SULFATE 2.5; .5 MG/3ML; MG/3ML
3 SOLUTION RESPIRATORY (INHALATION) ONCE AS NEEDED
Status: DISCONTINUED | OUTPATIENT
Start: 2023-06-16 | End: 2023-06-16 | Stop reason: HOSPADM

## 2023-06-16 RX ORDER — DEXTROSE, SODIUM CHLORIDE, AND POTASSIUM CHLORIDE 5; .45; .15 G/100ML; G/100ML; G/100ML
100 INJECTION INTRAVENOUS CONTINUOUS
Status: DISCONTINUED | OUTPATIENT
Start: 2023-06-16 | End: 2023-06-17 | Stop reason: HOSPADM

## 2023-06-16 RX ORDER — SODIUM CHLORIDE 0.9 % (FLUSH) 0.9 %
3-10 SYRINGE (ML) INJECTION AS NEEDED
Status: DISCONTINUED | OUTPATIENT
Start: 2023-06-16 | End: 2023-06-16 | Stop reason: HOSPADM

## 2023-06-16 RX ORDER — SODIUM CHLORIDE 9 MG/ML
INJECTION, SOLUTION INTRAVENOUS AS NEEDED
Status: DISCONTINUED | OUTPATIENT
Start: 2023-06-16 | End: 2023-06-16 | Stop reason: HOSPADM

## 2023-06-16 RX ORDER — PROMETHAZINE HYDROCHLORIDE 25 MG/1
25 SUPPOSITORY RECTAL ONCE AS NEEDED
Status: DISCONTINUED | OUTPATIENT
Start: 2023-06-16 | End: 2023-06-16 | Stop reason: HOSPADM

## 2023-06-16 RX ORDER — HYDRALAZINE HYDROCHLORIDE 20 MG/ML
5 INJECTION INTRAMUSCULAR; INTRAVENOUS
Status: DISCONTINUED | OUTPATIENT
Start: 2023-06-16 | End: 2023-06-16 | Stop reason: HOSPADM

## 2023-06-16 RX ORDER — NALOXONE HCL 0.4 MG/ML
0.4 VIAL (ML) INJECTION AS NEEDED
Status: DISCONTINUED | OUTPATIENT
Start: 2023-06-16 | End: 2023-06-16 | Stop reason: HOSPADM

## 2023-06-16 RX ORDER — MIDAZOLAM HYDROCHLORIDE 1 MG/ML
1 INJECTION INTRAMUSCULAR; INTRAVENOUS
Status: DISCONTINUED | OUTPATIENT
Start: 2023-06-16 | End: 2023-06-16 | Stop reason: HOSPADM

## 2023-06-16 RX ORDER — GABAPENTIN 100 MG/1
100 CAPSULE ORAL 3 TIMES DAILY
Status: DISCONTINUED | OUTPATIENT
Start: 2023-06-16 | End: 2023-06-17 | Stop reason: HOSPADM

## 2023-06-16 RX ORDER — LIDOCAINE HYDROCHLORIDE 10 MG/ML
INJECTION, SOLUTION EPIDURAL; INFILTRATION; INTRACAUDAL; PERINEURAL AS NEEDED
Status: DISCONTINUED | OUTPATIENT
Start: 2023-06-16 | End: 2023-06-16 | Stop reason: SURG

## 2023-06-16 RX ORDER — ONDANSETRON 4 MG/1
4 TABLET, FILM COATED ORAL EVERY 6 HOURS PRN
Status: DISCONTINUED | OUTPATIENT
Start: 2023-06-16 | End: 2023-06-17 | Stop reason: HOSPADM

## 2023-06-16 RX ORDER — PHENAZOPYRIDINE HYDROCHLORIDE 100 MG/1
200 TABLET, FILM COATED ORAL ONCE
Status: COMPLETED | OUTPATIENT
Start: 2023-06-16 | End: 2023-06-16

## 2023-06-16 RX ORDER — ONDANSETRON 2 MG/ML
INJECTION INTRAMUSCULAR; INTRAVENOUS AS NEEDED
Status: DISCONTINUED | OUTPATIENT
Start: 2023-06-16 | End: 2023-06-16 | Stop reason: SURG

## 2023-06-16 RX ORDER — SODIUM CHLORIDE 0.9 % (FLUSH) 0.9 %
3 SYRINGE (ML) INJECTION EVERY 12 HOURS SCHEDULED
Status: DISCONTINUED | OUTPATIENT
Start: 2023-06-16 | End: 2023-06-16 | Stop reason: HOSPADM

## 2023-06-16 RX ORDER — HYDROCODONE BITARTRATE AND ACETAMINOPHEN 5; 325 MG/1; MG/1
1 TABLET ORAL EVERY 4 HOURS PRN
Status: DISCONTINUED | OUTPATIENT
Start: 2023-06-16 | End: 2023-06-17 | Stop reason: HOSPADM

## 2023-06-16 RX ORDER — SODIUM CHLORIDE 9 MG/ML
40 INJECTION, SOLUTION INTRAVENOUS AS NEEDED
Status: DISCONTINUED | OUTPATIENT
Start: 2023-06-16 | End: 2023-06-16 | Stop reason: HOSPADM

## 2023-06-16 RX ORDER — HYDROCODONE BITARTRATE AND ACETAMINOPHEN 5; 325 MG/1; MG/1
1 TABLET ORAL ONCE AS NEEDED
Status: DISCONTINUED | OUTPATIENT
Start: 2023-06-16 | End: 2023-06-16 | Stop reason: HOSPADM

## 2023-06-16 RX ORDER — FENTANYL CITRATE 50 UG/ML
INJECTION, SOLUTION INTRAMUSCULAR; INTRAVENOUS AS NEEDED
Status: DISCONTINUED | OUTPATIENT
Start: 2023-06-16 | End: 2023-06-16 | Stop reason: SURG

## 2023-06-16 RX ORDER — FENTANYL CITRATE 50 UG/ML
50 INJECTION, SOLUTION INTRAMUSCULAR; INTRAVENOUS
Status: DISCONTINUED | OUTPATIENT
Start: 2023-06-16 | End: 2023-06-16 | Stop reason: HOSPADM

## 2023-06-16 RX ORDER — PROPOFOL 10 MG/ML
VIAL (ML) INTRAVENOUS AS NEEDED
Status: DISCONTINUED | OUTPATIENT
Start: 2023-06-16 | End: 2023-06-16 | Stop reason: SURG

## 2023-06-16 RX ORDER — ONDANSETRON 2 MG/ML
4 INJECTION INTRAMUSCULAR; INTRAVENOUS EVERY 6 HOURS PRN
Status: DISCONTINUED | OUTPATIENT
Start: 2023-06-16 | End: 2023-06-17 | Stop reason: HOSPADM

## 2023-06-16 RX ADMIN — GABAPENTIN 600 MG: 300 CAPSULE ORAL at 12:26

## 2023-06-16 RX ADMIN — IBUPROFEN 600 MG: 600 TABLET ORAL at 20:26

## 2023-06-16 RX ADMIN — ROCURONIUM BROMIDE 10 MG: 10 SOLUTION INTRAVENOUS at 14:07

## 2023-06-16 RX ADMIN — DEXAMETHASONE SODIUM PHOSPHATE 8 MG: 4 INJECTION, SOLUTION INTRAMUSCULAR; INTRAVENOUS at 13:02

## 2023-06-16 RX ADMIN — SODIUM CHLORIDE, POTASSIUM CHLORIDE, SODIUM LACTATE AND CALCIUM CHLORIDE 9 ML/HR: 600; 310; 30; 20 INJECTION, SOLUTION INTRAVENOUS at 12:00

## 2023-06-16 RX ADMIN — POTASSIUM CHLORIDE, DEXTROSE MONOHYDRATE AND SODIUM CHLORIDE 100 ML/HR: 150; 5; 450 INJECTION, SOLUTION INTRAVENOUS at 18:39

## 2023-06-16 RX ADMIN — FENTANYL CITRATE 100 MCG: 50 INJECTION, SOLUTION INTRAMUSCULAR; INTRAVENOUS at 13:32

## 2023-06-16 RX ADMIN — GABAPENTIN 100 MG: 100 CAPSULE ORAL at 20:26

## 2023-06-16 RX ADMIN — TRANEXAMIC ACID 1000 MG: 10 INJECTION, SOLUTION INTRAVENOUS at 13:42

## 2023-06-16 RX ADMIN — HYDROMORPHONE HYDROCHLORIDE 2 MG: 2 INJECTION, SOLUTION INTRAMUSCULAR; INTRAVENOUS; SUBCUTANEOUS at 15:24

## 2023-06-16 RX ADMIN — ONDANSETRON 4 MG: 2 INJECTION INTRAMUSCULAR; INTRAVENOUS at 12:52

## 2023-06-16 RX ADMIN — LIDOCAINE HYDROCHLORIDE 50 MG: 10 INJECTION, SOLUTION EPIDURAL; INFILTRATION; INTRACAUDAL; PERINEURAL at 12:58

## 2023-06-16 RX ADMIN — ACETAMINOPHEN 1000 MG: 500 TABLET ORAL at 12:26

## 2023-06-16 RX ADMIN — SUGAMMADEX 200 MG: 100 INJECTION, SOLUTION INTRAVENOUS at 15:15

## 2023-06-16 RX ADMIN — PHENAZOPYRIDINE 200 MG: 100 TABLET ORAL at 12:26

## 2023-06-16 RX ADMIN — ROCURONIUM BROMIDE 50 MG: 10 SOLUTION INTRAVENOUS at 12:58

## 2023-06-16 RX ADMIN — PROPOFOL 25 MCG/KG/MIN: 10 INJECTION, EMULSION INTRAVENOUS at 13:02

## 2023-06-16 RX ADMIN — PROPOFOL 200 MG: 10 INJECTION, EMULSION INTRAVENOUS at 12:58

## 2023-06-16 RX ADMIN — SCOPALAMINE 1 PATCH: 1 PATCH, EXTENDED RELEASE TRANSDERMAL at 12:27

## 2023-06-16 RX ADMIN — DOCUSATE SODIUM 100 MG: 100 CAPSULE, LIQUID FILLED ORAL at 20:26

## 2023-06-16 RX ADMIN — FAMOTIDINE 20 MG: 20 TABLET ORAL at 12:27

## 2023-06-16 RX ADMIN — FENTANYL CITRATE 100 MCG: 50 INJECTION, SOLUTION INTRAMUSCULAR; INTRAVENOUS at 12:58

## 2023-06-16 RX ADMIN — LIDOCAINE HYDROCHLORIDE 0.2 ML: 10 INJECTION, SOLUTION EPIDURAL; INFILTRATION; INTRACAUDAL; PERINEURAL at 12:00

## 2023-06-16 RX ADMIN — CEFAZOLIN SODIUM 2 G: 2 INJECTION, SOLUTION INTRAVENOUS at 13:02

## 2023-06-16 NOTE — ANESTHESIA PROCEDURE NOTES
Airway  Urgency: elective    Date/Time: 6/16/2023 1:01 PM  Airway not difficult    General Information and Staff    Patient location during procedure: OR    Indications and Patient Condition  Indications for airway management: airway protection    Preoxygenated: yes  MILS not maintained throughout  Mask difficulty assessment: 2 - vent by mask + OA or adjuvant +/- NMBA    Final Airway Details  Final airway type: endotracheal airway      Successful airway: ETT  Cuffed: yes   Successful intubation technique: direct laryngoscopy  Endotracheal tube insertion site: oral  Blade: Farhan  Blade size: 3  ETT size (mm): 7.0  Cormack-Lehane Classification: grade I - full view of glottis  Placement verified by: chest auscultation and capnometry   Measured from: lips  ETT/EBT  to lips (cm): 20  Number of attempts at approach: 1  Assessment: lips, teeth, and gum same as pre-op and atraumatic intubation    Additional Comments  Negative epigastric sounds, Breath sound equal bilaterally with symmetric chest rise and fall

## 2023-06-16 NOTE — ANESTHESIA PREPROCEDURE EVALUATION
Anesthesia Evaluation     Patient summary reviewed and Nursing notes reviewed   no history of anesthetic complications:  NPO Solid Status: > 8 hours  NPO Liquid Status: > 2 hours           Airway   Mallampati: II  TM distance: >3 FB  Neck ROM: full  No difficulty expected  Dental - normal exam     Pulmonary - negative pulmonary ROS and normal exam    breath sounds clear to auscultation  Cardiovascular - negative cardio ROS and normal exam    Rhythm: regular  Rate: normal        Neuro/Psych- negative ROS  GI/Hepatic/Renal/Endo    (+) obesity,       Musculoskeletal (-) negative ROS    Abdominal    Substance History      OB/GYN          Other   blood dyscrasia (h/o VonWillebrands, factor VIII, factor XII deficiency, no longer present on hematology testing),                     Anesthesia Plan    ASA 2     general     intravenous induction     Anesthetic plan, risks, benefits, and alternatives have been provided, discussed and informed consent has been obtained with: patient.    Plan discussed with CRNA.        CODE STATUS:

## 2023-06-16 NOTE — BRIEF OP NOTE
TOTAL LAPAROSCOPIC HYSTERECTOMY BILATERAL SALPINGECTOMY, POSTERIOR VAGINAL REPAIR  Progress Note    Lisa Mendez Martin  6/16/2023    Pre-op Diagnosis:   Pelvic pain, menometrorrhagia/AUB, Rectocele, perineal defect       Post-Op Diagnosis Codes:  LORNA    Procedure/CPT® Codes:    TOTAL LAPAROSCOPIC HYSTERECTOMY BILATERAL SALPINGECTOMY  POSTERIOR REPAIR, PERINEAL REPAIR    CYSTOSCOPY TEMPORARY PLACEMENT BILATERAL URETERAL CATHETER      Surgeon(s):  Ana Carias MD    Assistant surgeon:  Anand Smith MD    Intraoperative consult:  Emmanuel Mejía MD    Anesthesia: General    Staff:   Circulator: Emiliana Thompson, JHONY; Brandon Carballo RN  Scrub Person: Yue Boyd; Winnie Benton  Nursing Assistant: Sheyla Payne         Estimated Blood Loss: 100ml    Urine Voided: * No values recorded between 6/16/2023 12:52 PM and 6/16/2023  3:30 PM *    Specimens:                          Drains:   Urethral Catheter Silicone 16 Fr. (Active)   Daily Indications Selected surgeries ( tract, abdomen) 06/16/23 1545   Site Assessment Clean;Skin intact 06/16/23 1538   Collection Container Standard drainage bag 06/16/23 1545   Securement Method Securing device 06/16/23 1545       [REMOVED] Ureteral Drain/Stent  (Removed)       Findings:   Large grade 3 rectocele  ~10wk size uterus  Endometriotic implant on left side wall over left ureter  Normal tubes/ovaries        Complications: none        Ana Carias MD     Date: 6/16/2023  Time: 16:09 EDT

## 2023-06-16 NOTE — INTERVAL H&P NOTE
[facility] Pre-op    Full history and physical note from office is attached.    VS: /86  HR 72  RR 16  T 97.4 Sat 98%RA   LMP 06/04/2023     LAB Results:  Lab Results   Component Value Date    WBC 9.71 06/12/2023    HGB 9.1 (L) 06/12/2023    HCT 31.0 (L) 06/12/2023    MCV 79.1 06/12/2023     06/12/2023    NEUTROABS 6.74 06/12/2023    GLUCOSE 87 06/12/2023    BUN 13 06/12/2023    CREATININE 0.71 06/12/2023    EGFRIFNONA 89 11/08/2021    EGFRIFAFRI 102 11/08/2021     06/12/2023    K 4.0 06/12/2023     06/12/2023    CO2 23.0 06/12/2023    CALCIUM 8.9 06/12/2023    ALBUMIN 4.4 11/08/2021    AST 16 11/08/2021    ALT 16 11/08/2021    BILITOT 0.3 11/08/2021    PTT 35 04/11/2023    INR 0.97 01/02/2019       Cancer Staging (if applicable)  Cancer Patient: __ yes __no __unknown__N/A; If yes, clinical stage T:__ N:__M:__, stage group or __N/A      Impression: Abnormal uterine bleeding       Plan: TOTAL LAPAROSCOPIC HYSTERECTOMY BILATERAL SALPINGECTOMY; POSTERIOR REPAIR, PERINEAL REPAIR; CYSTOSCOPY TEMPORARY PLACEMENT BILATERAL URETERAL CATHETER       RAFAEL Shah   6/16/2023   12:01 EDT

## 2023-06-16 NOTE — ANESTHESIA POSTPROCEDURE EVALUATION
Patient: Lisa Martin    Procedure Summary       Date: 06/16/23 [facility]    Anesthesia Start: 1252 Anesthesia Stop: 1534    Procedures:       TOTAL LAPAROSCOPIC HYSTERECTOMY BILATERAL SALPINGECTOMY (Abdomen)      POSTERIOR REPAIR, PERINEAL REPAIR (Vagina)      CYSTOSCOPY TEMPORARY PLACEMENT BILATERAL URETERAL CATHETER (Bilateral) Diagnosis:     Surgeons: Ana Carias MD; Emmanuel Mejía MD Provider: Umberto Gregg MD    Anesthesia Type: general ASA Status: 2            Anesthesia Type: general    Vitals  No vitals data found for the desired time range.          Post Anesthesia Care and Evaluation    Patient location during evaluation: PACU  Patient participation: complete - patient participated  Level of consciousness: awake  Pain score: 0  Pain management: adequate    Airway patency: patent  Anesthetic complications: No anesthetic complications  PONV Status: none  Cardiovascular status: acceptable and stable  Respiratory status: nasal cannula, unassisted, acceptable and spontaneous ventilation  Hydration status: acceptable     16

## 2023-06-16 NOTE — OP NOTE
Cystourethroscopy & Intraoperative Bilateral Catheter Placement Operative Report    Patient Name:  Lisa Martin  YOB: 1975    Date of Surgery:  6/16/2023     Indications: 47-year-old female presenting for total laparoscopic hysterectomy, bilateral salpingo-oophorectomy.  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  Urology consulted for intraoperative ureteral identification catheter.  The risk benefits and alternative discussed with patient and she elects    Pre-op Diagnosis:   Abnormal uterine bleeding  Ureteral reimplantation       Post-Op Diagnosis Codes:  Normal uterine bleeding  Ureteral reimplantation    Procedure/CPT® Codes: 46237, 59336    1. CYSTOSCOPY BILATERAL URETERAL CATHETER/STENT INSERTION  2. MODIFIER 50, BILATERAL STENT PLACEMENT  3. SIMON CATHETER INSERTION  CHANGE    Staff:  Surgeon(s):  Emmanuel Mejía MD      Anesthesia: General    Estimated Blood Loss: none    Implants:    Nothing was implanted during the procedure    Specimen:          Specimens     ID Source Type Tests Collected By Collected At Frozen?    A Uterus with Cervix, Bilateral Tubes and Ovaries Tissue · TISSUE PATHOLOGY EXAM   Ana Carias MD 6/16/23 9626     This specimen was not marked as sent.              Findings:   1.  Normal urinary bladder  2. Identification of bilateral ureteral orifice ease.  5 Yemeni ureteral catheters were instilled without difficulty.  3.  Simon catheter insertion    Complications: None immediate    Description of Procedure:   The patient was identified in the preoperative holding area where informed consent was reviewed and signed. The patient was transported the operating room per anesthesia and placed supine on the operating table. Smooth endotracheal intubation was performed without issue after administration of general anesthesia. The patient was then placed in the dorsal lithotomy position where genitals were prepped and draped  in the usual sterile fashion. A brief timeout was performed identifying the correct patient procedure and laterality. Perioperative antibiotics were administered. All pressure points were padded.      Procedure began by inserting a 22 Bulgarian cystoscope atraumatically per the patient's urethra. Upon entering the bladder formal cystoscopy was performed identifying bilateral orthotopic ureteral orifices and no evidence of tumor, stone, foreign body. Attention was then turned to the right ureteral orifice. A 5 Fr open ended ureteral catheter was inserted into the distal ureter and passed up to the level right renal collecting system without difficulty.  Attention was then turned to the left ureteral orifice.  A 5 Fr open ended ureteralcatheter was inserted into the distal ureter and passed up to the level left renal collecting system without difficulty.       A 16F catheter was placed.  10 cc placed in the balloon.  The ureteral catheters were affixed to the Rivas catheter.     Patient remained under general anesthesia.  The patient will continue scheduled procedure with colorectal service, Dr. Aretha MD. Urology service available to assist in any way during the procedure.    Emmanuel Mejía MD     Date: 6/16/2023  Time: 15:15 EDT

## 2023-06-17 VITALS
DIASTOLIC BLOOD PRESSURE: 72 MMHG | HEIGHT: 69 IN | RESPIRATION RATE: 17 BRPM | OXYGEN SATURATION: 96 % | WEIGHT: 200 LBS | TEMPERATURE: 98.6 F | HEART RATE: 76 BPM | SYSTOLIC BLOOD PRESSURE: 114 MMHG | BODY MASS INDEX: 29.62 KG/M2

## 2023-06-17 LAB
ANION GAP SERPL CALCULATED.3IONS-SCNC: 9 MMOL/L (ref 5–15)
BUN SERPL-MCNC: 8 MG/DL (ref 6–20)
BUN/CREAT SERPL: 10.7 (ref 7–25)
CALCIUM SPEC-SCNC: 8.3 MG/DL (ref 8.6–10.5)
CHLORIDE SERPL-SCNC: 106 MMOL/L (ref 98–107)
CO2 SERPL-SCNC: 22 MMOL/L (ref 22–29)
CREAT SERPL-MCNC: 0.75 MG/DL (ref 0.57–1)
DEPRECATED RDW RBC AUTO: 41.2 FL (ref 37–54)
DEPRECATED RDW RBC AUTO: 41.3 FL (ref 37–54)
EGFRCR SERPLBLD CKD-EPI 2021: 99 ML/MIN/1.73
ERYTHROCYTE [DISTWIDTH] IN BLOOD BY AUTOMATED COUNT: 14.2 % (ref 12.3–15.4)
ERYTHROCYTE [DISTWIDTH] IN BLOOD BY AUTOMATED COUNT: 14.4 % (ref 12.3–15.4)
GLUCOSE SERPL-MCNC: 98 MG/DL (ref 65–99)
HCT VFR BLD AUTO: 27.3 % (ref 34–46.6)
HCT VFR BLD AUTO: 28.2 % (ref 34–46.6)
HGB BLD-MCNC: 7.9 G/DL (ref 12–15.9)
HGB BLD-MCNC: 8.2 G/DL (ref 12–15.9)
MCH RBC QN AUTO: 22.8 PG (ref 26.6–33)
MCH RBC QN AUTO: 23.2 PG (ref 26.6–33)
MCHC RBC AUTO-ENTMCNC: 28.9 G/DL (ref 31.5–35.7)
MCHC RBC AUTO-ENTMCNC: 29.1 G/DL (ref 31.5–35.7)
MCV RBC AUTO: 78.9 FL (ref 79–97)
MCV RBC AUTO: 79.7 FL (ref 79–97)
PLATELET # BLD AUTO: 317 10*3/MM3 (ref 140–450)
PLATELET # BLD AUTO: 334 10*3/MM3 (ref 140–450)
PMV BLD AUTO: 11.3 FL (ref 6–12)
PMV BLD AUTO: 11.9 FL (ref 6–12)
POTASSIUM SERPL-SCNC: 4.4 MMOL/L (ref 3.5–5.2)
RBC # BLD AUTO: 3.46 10*6/MM3 (ref 3.77–5.28)
RBC # BLD AUTO: 3.54 10*6/MM3 (ref 3.77–5.28)
SODIUM SERPL-SCNC: 137 MMOL/L (ref 136–145)
WBC NRBC COR # BLD: 11.6 10*3/MM3 (ref 3.4–10.8)
WBC NRBC COR # BLD: 13.51 10*3/MM3 (ref 3.4–10.8)

## 2023-06-17 PROCEDURE — 80048 BASIC METABOLIC PNL TOTAL CA: CPT | Performed by: OBSTETRICS & GYNECOLOGY

## 2023-06-17 PROCEDURE — 85027 COMPLETE CBC AUTOMATED: CPT | Performed by: OBSTETRICS & GYNECOLOGY

## 2023-06-17 RX ORDER — PSEUDOEPHEDRINE HCL 30 MG
100 TABLET ORAL 2 TIMES DAILY
Qty: 60 CAPSULE | Refills: 2 | Status: SHIPPED | OUTPATIENT
Start: 2023-06-17

## 2023-06-17 RX ORDER — HYDROCODONE BITARTRATE AND ACETAMINOPHEN 5; 325 MG/1; MG/1
1-2 TABLET ORAL EVERY 4 HOURS PRN
Qty: 20 TABLET | Refills: 0 | Status: SHIPPED | OUTPATIENT
Start: 2023-06-17 | End: 2023-06-26

## 2023-06-17 RX ORDER — IBUPROFEN 600 MG/1
600 TABLET ORAL EVERY 8 HOURS PRN
Qty: 20 TABLET | Refills: 1 | Status: SHIPPED | OUTPATIENT
Start: 2023-06-17

## 2023-06-17 RX ORDER — NALOXONE HYDROCHLORIDE 4 MG/.1ML
SPRAY NASAL
Qty: 2 EACH | Refills: 0 | Status: SHIPPED | OUTPATIENT
Start: 2023-06-17

## 2023-06-17 RX ORDER — GABAPENTIN 100 MG/1
100 CAPSULE ORAL 3 TIMES DAILY
Qty: 4 CAPSULE | Refills: 0 | Status: SHIPPED | OUTPATIENT
Start: 2023-06-17 | End: 2023-06-19

## 2023-06-17 RX ADMIN — IBUPROFEN 600 MG: 600 TABLET ORAL at 13:43

## 2023-06-17 RX ADMIN — GABAPENTIN 100 MG: 100 CAPSULE ORAL at 08:39

## 2023-06-17 RX ADMIN — POTASSIUM CHLORIDE, DEXTROSE MONOHYDRATE AND SODIUM CHLORIDE 100 ML/HR: 150; 5; 450 INJECTION, SOLUTION INTRAVENOUS at 04:57

## 2023-06-17 RX ADMIN — IBUPROFEN 600 MG: 600 TABLET ORAL at 05:08

## 2023-06-17 RX ADMIN — DOCUSATE SODIUM 100 MG: 100 CAPSULE, LIQUID FILLED ORAL at 08:39

## 2023-06-17 RX ADMIN — GABAPENTIN 100 MG: 100 CAPSULE ORAL at 16:56

## 2023-06-17 NOTE — PLAN OF CARE
Goal Outcome Evaluation:  Plan of Care Reviewed With: patient        Progress: improving  Outcome Evaluation: Demonstrates safety verbalizes understanding. Minimal pain. Dcd with abd binder on.

## 2023-06-17 NOTE — PROGRESS NOTES
2023    Name:Lisa Martin   MR#:[id number]      GYN Progress Note       HD:0    Subjective   47 y.o.yo  POD#1 status post total laparoscopic hysterectomy with bilateral salpingo oophorectomy posterior repair and perineorrhaphy    Tolerating minimal diet no nausea  Pain well controlled  No flatus yet  Voiding    Patient Active Problem List    Diagnosis     *Abnormal uterine bleeding (AUB) [N93.9]     Breast pain, right [N64.4]     Rectocele [N81.6]     Perimenopausal [N95.1]     Thickened endometrium [R93.89]     Sebaceous cyst [L72.3]     Breast lump [N63.0]     Von Willebrand disease [D68.00]        Objective     Vital Signs Range for the last 24 hours  Temp: Temp:  [97 °F (36.1 °C)-98.3 °F (36.8 °C)] 98 °F (36.7 °C) Temp src: Temporal  BP: BP: (100-173)/() 121/75   BP Readings from Last 3 Encounters:   23 121/75   23 124/86   23 120/70     Pulse: Heart Rate:  [66-97] 67   Pulse Readings from Last 3 Encounters:   23 67   23 76   19 66     Resp:  Resp:  [12-18] 18    I/O last 3 completed shifts:  In: 700 [I.V.:600; IV Piggyback:100]  Out: 2250 [Urine:2250]  I/O this shift:  In: 240 [P.O.:240]  Out: -       Results from last 7 days   Lab Units 23  0427 23  1054   WBC 10*3/mm3 11.60* 9.71   HEMOGLOBIN g/dL 8.2* 9.1*   HEMATOCRIT % 28.2* 31.0*   PLATELETS 10*3/mm3 334 393     Results from last 7 days   Lab Units 23  0427   SODIUM mmol/L 137   POTASSIUM mmol/L 4.4   CHLORIDE mmol/L 106   CO2 mmol/L 22.0   BUN mg/dL 8   CREATININE mg/dL 0.75   CALCIUM mg/dL 8.3*   GLUCOSE mg/dL 98     Results from last 7 days   Lab Units 23  0427   SODIUM mmol/L 137   POTASSIUM mmol/L 4.4   CHLORIDE mmol/L 106   CO2 mmol/L 22.0   BUN mg/dL 8   CREATININE mg/dL 0.75   GLUCOSE mg/dL 98   CALCIUM mg/dL 8.3*         Physical Exam:  General Appearance:  awake, alert, oriented, in no acute distress  Head/face:  NCAT  Lungs:  Normal expansion.  Clear to  auscultation.  No rales, rhonchi, or wheezing.  Heart:  Heart sounds are normal.  Regular rate and rhythm without murmur, gallop or rub.  Abdomen:  Skin: clean, dry, intact ecchymosis- at port sites  Auscultation: tympanic  Palpation: soft, distended, approp TTP  Extremities: NTTP, no edema, SCDS bilaterally      Assessment/Plan   1.  Considering bleeding diathesis, will ensure CBC stable prior to discharge.  Also needs flatus prior to discharge.  2.   Encourage ambulation.  Routine postoperative care  3.   Has clinic follow-up in 2 weeks        Ana Carias MD  6/17/2023 10:32 EDT

## 2023-06-18 LAB
BH BB BLOOD EXPIRATION DATE: NORMAL
BH BB BLOOD EXPIRATION DATE: NORMAL
BH BB BLOOD TYPE BARCODE: 5100
BH BB BLOOD TYPE BARCODE: 5100
BH BB DISPENSE STATUS: NORMAL
BH BB DISPENSE STATUS: NORMAL
BH BB PRODUCT CODE: NORMAL
BH BB PRODUCT CODE: NORMAL
BH BB UNIT NUMBER: NORMAL
BH BB UNIT NUMBER: NORMAL
CROSSMATCH INTERPRETATION: NORMAL
CROSSMATCH INTERPRETATION: NORMAL
UNIT  ABO: NORMAL
UNIT  ABO: NORMAL
UNIT  RH: NORMAL
UNIT  RH: NORMAL

## 2023-06-19 NOTE — OP NOTE
OPERATIVE REPORT     TOTAL LAPAROSCOPIC HYSTERECTOMY BILATERAL SALPINGECTOMY, POSTERIOR VAGINAL REPAIR, PERINEORRHAPHY, REMOVAL LEFT VULVAR CYST     Lisa Martin  6/16/2023     Pre-op Diagnosis:   Pelvic pain, menometrorrhagia/AUB, Rectocele, perineal defect       Post-Op Diagnosis Codes:  LORNA     Procedure/CPT® Codes:     TOTAL LAPAROSCOPIC HYSTERECTOMY BILATERAL SALPINGECTOMY  POSTERIOR REPAIR, PERINEAL REPAIR     CYSTOSCOPY TEMPORARY PLACEMENT BILATERAL URETERAL CATHETER        Surgeon(s):  Ana Carias MD     Assistant surgeon:  Anand mSith MD     Intraoperative consult:  Emmanuel Mejaí MD     Anesthesia: General     Staff:   Circulator: Emiliana Thompson, JHONY; Brandon Carballo RN  Scrub Person: Yue Boyd; Winnie Benton  Nursing Assistant: Sheyla Payne           Estimated Blood Loss: 100ml     Urine Voided: * No values recorded between 6/16/2023 12:52 PM and 6/16/2023  3:30 PM *     Specimens:                             Drains:        Urethral Catheter Silicone 16 Fr. (Active)   Daily Indications Selected surgeries ( tract, abdomen) 06/16/23 1545   Site Assessment Clean;Skin intact 06/16/23 1538   Collection Container Standard drainage bag 06/16/23 1545   Securement Method Securing device 06/16/23 1545       [REMOVED] Ureteral Drain/Stent  (Removed)         Findings:   Large grade 3 rectocele  ~10wk size uterus  Endometriotic implant on left side wall over left ureter  Normal tubes/ovaries           Complications: none       PROCEDURE:      Description of Procedure:     After informed consent was obtained and patient was received she was taken operating room where general anesthesia was found to be adequate. Patient was placed in dorsal lithotomy position and Rivas catheter was placed in the bladder.       A *** V care was placed vaginally for uterine manipulation after 2 sutures placed on the cervix and the cervix was dilated sufficiently.       A 10 mm infraumbilical  skin incision was made by first infiltrating with quarter percent Marcaine in size and scalpel and entering under direct visualization using the Optiview technique.  Patient was placed in Trendelenburg position and abdomen was insufflated with CO2 gas to approximately 15 mmHg.       Abdomen was visualized.  *** was seen.  Decision was made to place right and left lower quadrant 5mm ports.  They were placed under direct visualization in similar fashion.       The ureters were seen coursing well away from the operative field.  The ovaries appeared *** and so decision was made to leave them in place***.       The ligasure clamp was used to clamp burn and cut along the mesosalpinx until tubes were free bilaterally.  A grasper was used in the contralateral port for counter traction.  Next the uteroovarian ligaments and vessels were burned proximally and distally before finally cutting between with ligasure clamp.          The round ligaments were similarly clamped burned and cut using the LigaSure clamp.  The anterior leaf the broad ligament was taken inferiorly and superiorly to create the bladder flap.  The posterior was taken down towards the cervix to allow visualization of the uterine artery.  This was cauterized using LigaSure clamp in multiple cycles before finally cutting using the LigaSure clamp.  The bladder was ensured to be down below the ring of the V care uterine manipulator.  This was done with a combination of sharp and blunt dissection and gave good result.     Once the V care ring could be visualized in a circumferential fashion, monopolar hook was used to incise under cautery circumferentially until the vaginal cuff was opened in its entirety.  The specimen was removed through the vagina. Specimen was sent for permanent.     Cuff was closed with running locking 0 Vicryl suture.   Care was taken to incorporate the vaginal mucosa and uterosacral ligaments.  Once the cuff was closed, the abdomen was  reinspected laparoscopically and irrigated.  The vaginal cuff and all pedicles appeared to be very hemostatic.  When insufflation was brought down to 8 mmHg, hemostasis was still seen and so the decision was made to close.  All instruments were removed.  All instrument counts are correct.  All port sites were closed with 3-0 Vicryl in a subcuticular fashion and Band-Aids were placed for bandages.  The Rivas catheter was removed.  Patient tolerated the procedure well was taken to recovery room in stable condition.    Ana Carias MD    06/19/23  03:18 EDT

## 2023-07-03 PROBLEM — Z48.89 POSTOPERATIVE VISIT: Status: ACTIVE | Noted: 2023-07-03

## 2023-08-01 ENCOUNTER — OFFICE VISIT (OUTPATIENT)
Dept: OBSTETRICS AND GYNECOLOGY | Facility: CLINIC | Age: 48
End: 2023-08-01
Payer: COMMERCIAL

## 2023-08-01 VITALS
HEIGHT: 69 IN | DIASTOLIC BLOOD PRESSURE: 80 MMHG | BODY MASS INDEX: 29.03 KG/M2 | WEIGHT: 196 LBS | SYSTOLIC BLOOD PRESSURE: 124 MMHG

## 2023-08-01 DIAGNOSIS — R14.0 BLOATING: ICD-10-CM

## 2023-08-01 DIAGNOSIS — R53.82 CHRONIC FATIGUE: Primary | ICD-10-CM

## 2023-08-01 DIAGNOSIS — Z48.89 POSTOPERATIVE VISIT: ICD-10-CM

## 2023-08-01 PROCEDURE — 99024 POSTOP FOLLOW-UP VISIT: CPT | Performed by: OBSTETRICS & GYNECOLOGY

## 2023-08-01 RX ORDER — BUPROPION HYDROCHLORIDE 150 MG/1
150 TABLET ORAL EVERY MORNING
Qty: 30 TABLET | Refills: 2 | Status: SHIPPED | OUTPATIENT
Start: 2023-08-01 | End: 2024-07-31

## 2023-08-02 LAB
25(OH)D3+25(OH)D2 SERPL-MCNC: 26.2 NG/ML (ref 30–100)
BASOPHILS # BLD AUTO: 0.06 10*3/MM3 (ref 0–0.2)
BASOPHILS NFR BLD AUTO: 0.7 % (ref 0–1.5)
EOSINOPHIL # BLD AUTO: 0.24 10*3/MM3 (ref 0–0.4)
EOSINOPHIL NFR BLD AUTO: 3 % (ref 0.3–6.2)
ERYTHROCYTE [DISTWIDTH] IN BLOOD BY AUTOMATED COUNT: 18.7 % (ref 12.3–15.4)
HCT VFR BLD AUTO: 37.6 % (ref 34–46.6)
HGB BLD-MCNC: 11.6 G/DL (ref 12–15.9)
IMM GRANULOCYTES # BLD AUTO: 0.02 10*3/MM3 (ref 0–0.05)
IMM GRANULOCYTES NFR BLD AUTO: 0.2 % (ref 0–0.5)
LYMPHOCYTES # BLD AUTO: 1.91 10*3/MM3 (ref 0.7–3.1)
LYMPHOCYTES NFR BLD AUTO: 23.6 % (ref 19.6–45.3)
MCH RBC QN AUTO: 24.4 PG (ref 26.6–33)
MCHC RBC AUTO-ENTMCNC: 30.9 G/DL (ref 31.5–35.7)
MCV RBC AUTO: 79.2 FL (ref 79–97)
MONOCYTES # BLD AUTO: 0.79 10*3/MM3 (ref 0.1–0.9)
MONOCYTES NFR BLD AUTO: 9.8 % (ref 5–12)
NEUTROPHILS # BLD AUTO: 5.08 10*3/MM3 (ref 1.7–7)
NEUTROPHILS NFR BLD AUTO: 62.7 % (ref 42.7–76)
NRBC BLD AUTO-RTO: 0 /100 WBC (ref 0–0.2)
PLATELET # BLD AUTO: 315 10*3/MM3 (ref 140–450)
RBC # BLD AUTO: 4.75 10*6/MM3 (ref 3.77–5.28)
TSH SERPL DL<=0.005 MIU/L-ACNC: 1.15 UIU/ML (ref 0.27–4.2)
WBC # BLD AUTO: 8.1 10*3/MM3 (ref 3.4–10.8)

## 2023-08-04 PROBLEM — R53.82 CHRONIC FATIGUE: Status: ACTIVE | Noted: 2023-08-04

## 2023-08-04 PROBLEM — R14.0 BLOATING: Status: ACTIVE | Noted: 2023-08-04

## 2023-08-19 ENCOUNTER — APPOINTMENT (OUTPATIENT)
Dept: CT IMAGING | Facility: HOSPITAL | Age: 48
End: 2023-08-19
Payer: COMMERCIAL

## 2023-08-19 ENCOUNTER — HOSPITAL ENCOUNTER (EMERGENCY)
Facility: HOSPITAL | Age: 48
Discharge: HOME | End: 2023-08-19
Attending: EMERGENCY MEDICINE
Payer: COMMERCIAL

## 2023-08-19 VITALS
WEIGHT: 195 LBS | HEIGHT: 69 IN | HEART RATE: 70 BPM | BODY MASS INDEX: 28.88 KG/M2 | SYSTOLIC BLOOD PRESSURE: 148 MMHG | RESPIRATION RATE: 13 BRPM | DIASTOLIC BLOOD PRESSURE: 94 MMHG | TEMPERATURE: 98.8 F | OXYGEN SATURATION: 97 %

## 2023-08-19 DIAGNOSIS — K52.9 PROCTOCOLITIS WITHOUT COMPLICATION: Primary | ICD-10-CM

## 2023-08-19 DIAGNOSIS — R03.0 ELEVATED BLOOD PRESSURE READING: ICD-10-CM

## 2023-08-19 DIAGNOSIS — R19.7 BLOODY DIARRHEA: ICD-10-CM

## 2023-08-19 LAB
ADV 40+41 DNA STL QL NAA+NON-PROBE: NOT DETECTED
ALBUMIN SERPL-MCNC: 3.8 G/DL (ref 3.5–5.2)
ALBUMIN/GLOB SERPL: 0.9 G/DL
ALP SERPL-CCNC: 107 U/L (ref 39–117)
ALT SERPL W P-5'-P-CCNC: 25 U/L (ref 1–33)
ANION GAP SERPL CALCULATED.3IONS-SCNC: 8 MMOL/L (ref 5–15)
AST SERPL-CCNC: 24 U/L (ref 1–32)
ASTRO TYP 1-8 RNA STL QL NAA+NON-PROBE: NOT DETECTED
BASOPHILS # BLD AUTO: 0.03 10*3/MM3 (ref 0–0.2)
BASOPHILS NFR BLD AUTO: 0.4 % (ref 0–1.5)
BILIRUB SERPL-MCNC: 0.3 MG/DL (ref 0–1.2)
BILIRUB UR QL STRIP: NEGATIVE
BUN SERPL-MCNC: 14 MG/DL (ref 6–20)
BUN/CREAT SERPL: 17.3 (ref 7–25)
C CAYETANENSIS DNA STL QL NAA+NON-PROBE: NOT DETECTED
C COLI+JEJ+UPSA DNA STL QL NAA+NON-PROBE: NOT DETECTED
CALCIUM SPEC-SCNC: 9.2 MG/DL (ref 8.6–10.5)
CHLORIDE SERPL-SCNC: 106 MMOL/L (ref 98–107)
CLARITY UR: CLEAR
CO2 SERPL-SCNC: 25 MMOL/L (ref 22–29)
COLOR UR: YELLOW
CREAT SERPL-MCNC: 0.81 MG/DL (ref 0.57–1)
CRYPTOSP DNA STL QL NAA+NON-PROBE: NOT DETECTED
D-LACTATE SERPL-SCNC: 0.7 MMOL/L (ref 0.5–2)
DEPRECATED RDW RBC AUTO: 58.4 FL (ref 37–54)
E HISTOLYT DNA STL QL NAA+NON-PROBE: NOT DETECTED
EAEC PAA PLAS AGGR+AATA ST NAA+NON-PRB: NOT DETECTED
EC STX1+STX2 GENES STL QL NAA+NON-PROBE: NOT DETECTED
EGFRCR SERPLBLD CKD-EPI 2021: 89.7 ML/MIN/1.73
EOSINOPHIL # BLD AUTO: 0.15 10*3/MM3 (ref 0–0.4)
EOSINOPHIL NFR BLD AUTO: 2 % (ref 0.3–6.2)
EPEC EAE GENE STL QL NAA+NON-PROBE: NOT DETECTED
ERYTHROCYTE [DISTWIDTH] IN BLOOD BY AUTOMATED COUNT: 19.9 % (ref 12.3–15.4)
ETEC LTA+ST1A+ST1B TOX ST NAA+NON-PROBE: NOT DETECTED
G LAMBLIA DNA STL QL NAA+NON-PROBE: NOT DETECTED
GLOBULIN UR ELPH-MCNC: 4.1 GM/DL
GLUCOSE SERPL-MCNC: 102 MG/DL (ref 65–99)
GLUCOSE UR STRIP-MCNC: NEGATIVE MG/DL
HCT VFR BLD AUTO: 38.7 % (ref 34–46.6)
HEMOCCULT STL QL: POSITIVE
HGB BLD-MCNC: 11.8 G/DL (ref 12–15.9)
HGB UR QL STRIP.AUTO: NEGATIVE
HOLD SPECIMEN: NORMAL
IMM GRANULOCYTES # BLD AUTO: 0.02 10*3/MM3 (ref 0–0.05)
IMM GRANULOCYTES NFR BLD AUTO: 0.3 % (ref 0–0.5)
KETONES UR QL STRIP: NEGATIVE
LEUKOCYTE ESTERASE UR QL STRIP.AUTO: NEGATIVE
LIPASE SERPL-CCNC: 33 U/L (ref 13–60)
LYMPHOCYTES # BLD AUTO: 1.6 10*3/MM3 (ref 0.7–3.1)
LYMPHOCYTES NFR BLD AUTO: 21.7 % (ref 19.6–45.3)
MCH RBC QN AUTO: 25.2 PG (ref 26.6–33)
MCHC RBC AUTO-ENTMCNC: 30.5 G/DL (ref 31.5–35.7)
MCV RBC AUTO: 82.5 FL (ref 79–97)
MONOCYTES # BLD AUTO: 0.77 10*3/MM3 (ref 0.1–0.9)
MONOCYTES NFR BLD AUTO: 10.4 % (ref 5–12)
NEUTROPHILS NFR BLD AUTO: 4.81 10*3/MM3 (ref 1.7–7)
NEUTROPHILS NFR BLD AUTO: 65.2 % (ref 42.7–76)
NITRITE UR QL STRIP: NEGATIVE
NOROVIRUS GI+II RNA STL QL NAA+NON-PROBE: NOT DETECTED
NRBC BLD AUTO-RTO: 0 /100 WBC (ref 0–0.2)
P SHIGELLOIDES DNA STL QL NAA+NON-PROBE: NOT DETECTED
PH UR STRIP.AUTO: <=5 [PH] (ref 5–8)
PLATELET # BLD AUTO: 260 10*3/MM3 (ref 140–450)
PMV BLD AUTO: 11.4 FL (ref 6–12)
POTASSIUM SERPL-SCNC: 4.2 MMOL/L (ref 3.5–5.2)
PROT SERPL-MCNC: 7.9 G/DL (ref 6–8.5)
PROT UR QL STRIP: NEGATIVE
RBC # BLD AUTO: 4.69 10*6/MM3 (ref 3.77–5.28)
RVA RNA STL QL NAA+NON-PROBE: NOT DETECTED
S ENT+BONG DNA STL QL NAA+NON-PROBE: NOT DETECTED
SAPO I+II+IV+V RNA STL QL NAA+NON-PROBE: NOT DETECTED
SHIGELLA SP+EIEC IPAH ST NAA+NON-PROBE: NOT DETECTED
SODIUM SERPL-SCNC: 139 MMOL/L (ref 136–145)
SP GR UR STRIP: 1.02 (ref 1–1.03)
UROBILINOGEN UR QL STRIP: NORMAL
V CHOL+PARA+VUL DNA STL QL NAA+NON-PROBE: NOT DETECTED
V CHOLERAE DNA STL QL NAA+NON-PROBE: NOT DETECTED
WBC NRBC COR # BLD: 7.38 10*3/MM3 (ref 3.4–10.8)
WHOLE BLOOD HOLD COAG: NORMAL
WHOLE BLOOD HOLD SPECIMEN: NORMAL
Y ENTEROCOL DNA STL QL NAA+NON-PROBE: NOT DETECTED

## 2023-08-19 PROCEDURE — 25510000001 IOPAMIDOL 61 % SOLUTION: Performed by: EMERGENCY MEDICINE

## 2023-08-19 PROCEDURE — 87507 IADNA-DNA/RNA PROBE TQ 12-25: CPT | Performed by: EMERGENCY MEDICINE

## 2023-08-19 PROCEDURE — 83690 ASSAY OF LIPASE: CPT | Performed by: EMERGENCY MEDICINE

## 2023-08-19 PROCEDURE — 99285 EMERGENCY DEPT VISIT HI MDM: CPT

## 2023-08-19 PROCEDURE — 80053 COMPREHEN METABOLIC PANEL: CPT | Performed by: EMERGENCY MEDICINE

## 2023-08-19 PROCEDURE — 74177 CT ABD & PELVIS W/CONTRAST: CPT

## 2023-08-19 PROCEDURE — 81003 URINALYSIS AUTO W/O SCOPE: CPT | Performed by: EMERGENCY MEDICINE

## 2023-08-19 PROCEDURE — 85025 COMPLETE CBC W/AUTO DIFF WBC: CPT | Performed by: EMERGENCY MEDICINE

## 2023-08-19 PROCEDURE — 83605 ASSAY OF LACTIC ACID: CPT | Performed by: EMERGENCY MEDICINE

## 2023-08-19 PROCEDURE — 82272 OCCULT BLD FECES 1-3 TESTS: CPT | Performed by: EMERGENCY MEDICINE

## 2023-08-19 RX ORDER — SODIUM CHLORIDE 9 MG/ML
10 INJECTION INTRAVENOUS AS NEEDED
Status: DISCONTINUED | OUTPATIENT
Start: 2023-08-19 | End: 2023-08-19 | Stop reason: HOSPADM

## 2023-08-19 RX ORDER — ACETAMINOPHEN 500 MG
1000 TABLET ORAL EVERY 6 HOURS PRN
Qty: 30 TABLET | Refills: 0 | Status: SHIPPED | OUTPATIENT
Start: 2023-08-19

## 2023-08-19 RX ORDER — DICYCLOMINE HYDROCHLORIDE 10 MG/1
20 CAPSULE ORAL ONCE
Status: COMPLETED | OUTPATIENT
Start: 2023-08-19 | End: 2023-08-19

## 2023-08-19 RX ADMIN — IOPAMIDOL 85 ML: 612 INJECTION, SOLUTION INTRAVENOUS at 12:05

## 2023-08-19 RX ADMIN — DICYCLOMINE HYDROCHLORIDE 20 MG: 10 CAPSULE ORAL at 10:40

## 2023-08-19 RX ADMIN — SODIUM CHLORIDE, POTASSIUM CHLORIDE, SODIUM LACTATE AND CALCIUM CHLORIDE 1000 ML: 600; 310; 30; 20 INJECTION, SOLUTION INTRAVENOUS at 10:40

## 2023-08-19 NOTE — ED PROVIDER NOTES
"Subjective   History of Present Illness  Patient is a 48-year-old female who developed abdominal cramping, bloating, distention overnight and eventually developed diarrhea this morning.  She noted that the diarrhea had blood involvement.  Patient denies any recent international travel.  No recent antibiotics.  Patient is status post hysterectomy performed in June.  Patient reports she has had \"bowel issues\" for much of her life.  She reports she has been diagnosed with IBS in the past.  She reports she frequently has bloating and discomfort after eating.  Patient denies ever having blood in the stool.  Patient is not on any blood thinners.    History provided by:  Patient    Review of Systems    Past Medical History:   Diagnosis Date    Anemia 10/2022    Bleeding disorder 1982    Von Willibrand's, Factor 8, Factor 12    Cervical dysplasia     Factor VIII (functional) deficiency     past few months at , tests have shown she no longer has this    Factor XII deficiency     past few months at , tests have shown she no longer has this    History of abnormal uterine bleeding     History of vertigo     Irritable bowel syndrome     VIC (obstructive sleep apnea)     mild, no cpap    Ovarian cyst     Pelvic prolapse     Shortness of breath on exertion     due to anemia    Von Willebrand disease     diagnosed at age 6, past few months at , tests have shown she no longer has this       Allergies   Allergen Reactions    Aspirin Other (See Comments)     Pt has Von Willebrand disease     Ibuprofen Other (See Comments)     Pt has Von Willebrand disease        Past Surgical History:   Procedure Laterality Date    BREAST BIOPSY Right 2008    x 2    BREAST EXCISIONAL BIOPSY Right 2007    x 2    BREAST LUMPECTOMY Right 2008    BENIGN    COLONOSCOPY      COLPOSCOPY  2005    CYSTOSCOPY Bilateral 06/16/2023    Procedure: CYSTOSCOPY TEMPORARY PLACEMENT BILATERAL URETERAL CATHETER;  Surgeon: Emmanuel Mejía MD;  Location: [facility];  " Service: Urology;  Laterality: Bilateral;    ENDOSCOPIC FUNCTIONAL SINUS SURGERY (FESS) Bilateral 01/08/2019    Procedure: BILATERAL TOTAL ETHMOIDECTOMY,BILATERAL FRONTAL SINUPLASTY WITH TISSUE REMOVAL, BILATERAL MAXILLARY SINUPLASTY WITH TISSUE REMOVAL;  Surgeon: Jose Camacho MD;  Location:  [facility];  Service: ENT    FERTILITY SURGERY      1 round IUI with Akin    GYNECOLOGIC CRYOSURGERY  11/2005    HERNIA REPAIR      age 6- inguinal - unsure which side - no mesh in place    INGUINAL HERNIA REPAIR  1981    LAPAROSCOPIC ASSISTED VAGINAL HYSTERECTOMY SALPINGO OOPHORECTOMY  6/2023    POSTERIOR VAGINAL REPAIR N/A 06/16/2023    Procedure: POSTERIOR REPAIR, PERINEAL REPAIR;  Surgeon: Ana Carias MD;  Location:  [facility];  Service: Obstetrics/Gynecology;  Laterality: N/A;    TOTAL LAPAROSCOPIC HYSTERECTOMY SALPINGECTOMY N/A 06/16/2023    Procedure: TOTAL LAPAROSCOPIC HYSTERECTOMY BILATERAL SALPINGECTOMY;  Surgeon: Ana Carias MD;  Location:  [facility];  Service: Obstetrics/Gynecology;  Laterality: N/A;    URETHERAL RE-IMPLANTATION  AGE 4    VAGINAL PROLAPSE REPAIR  6/2023    WISDOM TOOTH EXTRACTION         Family History   Problem Relation Age of Onset    Hypertension Father     Lymphoma Father         non-Hodgkins    Hypothyroidism Mother     Hyperlipidemia Mother     Stroke Mother     Breast cancer Neg Hx     Ovarian cancer Neg Hx     Colon cancer Neg Hx        Social History     Socioeconomic History    Marital status:    Tobacco Use    Smoking status: Never    Smokeless tobacco: Never   Vaping Use    Vaping Use: Never used   Substance and Sexual Activity    Alcohol use: Not Currently     Comment: 0-1x monthly    Drug use: No    Sexual activity: Yes     Partners: Male     Birth control/protection: Other     Comment: 's vasectomy           Objective   Physical Exam  Vitals and nursing note reviewed.   Constitutional:       General: She is not in acute distress.      Appearance: She is well-developed. She is not toxic-appearing.   Cardiovascular:      Rate and Rhythm: Normal rate and regular rhythm.      Heart sounds: Normal heart sounds.   Pulmonary:      Effort: Pulmonary effort is normal. No respiratory distress.      Breath sounds: Normal breath sounds.   Abdominal:      General: There is distension.      Palpations: Abdomen is soft.      Tenderness: There is generalized abdominal tenderness. There is no guarding. Negative signs include Victoria's sign and McBurney's sign.      Comments: Mildly distended abdomen.  Mild diffuse tenderness to palpation but no surgical signs.  Specifically, no rigidity or guarding.   Neurological:      Mental Status: She is alert and oriented to person, place, and time.   Psychiatric:         Mood and Affect: Mood normal.         Behavior: Behavior normal.       Procedures           ED Course  ED Course as of 08/19/23 1914   Sat Aug 19, 2023   1122 Lactate: 0.7  Normal lactate level. [RS]   1122 Hemoglobin(!): 11.8  Stable mild chronic anemia when compared to value from 2 weeks ago. [RS]   1131 Fecal Occult Blood(!): Positive  Heme positive stool which is consistent with gross exam demonstrating thin blood throughout [RS]   1246 CT Abdomen Pelvis With Contrast  Personally reviewed the CT scan images of the abdomen and pelvis.  On my interpretation there is no obstruction or obvious surgical abnormality visualized. [RS]   1249 Patient updated on the findings thus far.  GI paged. [RS]   1313 Case discussed with Dr. Stoner with gastroenterology who agrees that the patient does not need to be admitted and will message the patient's primary gastroenterologist for close interval follow-up.  He does not recommend any antibiotics at this time.  We will plan to discharge the patient home.  I did talk with the patient about the findings and the plan.  I discussed the differential diagnosis initially as well as the expected course and follow-up instructions.  I have reviewed results, considerations, and diagnosis with the patient and/or their representative. Anticipatory guidance provided. Follow-up plan reviewed. Precautions for acute return for re-evaluations also reviewed. This including potential for worsening of the presenting condition and need for further evaluation, admission, and/or intervention as indicated. Opportunity to as questions provided. I advised them to return for any concerns and stressed the importance of timely follow-up and outpatient services. They verbalized understanding.   [RS]      ED Course User Index  [RS] Migel Yin MD                                           Medical Decision Making  Problems Addressed:  Bloody diarrhea: complicated acute illness or injury  Elevated blood pressure reading: complicated acute illness or injury  Proctocolitis without complication: complicated acute illness or injury    Amount and/or Complexity of Data Reviewed  Labs: ordered. Decision-making details documented in ED Course.  Radiology: ordered. Decision-making details documented in ED Course.    Risk  OTC drugs.  Prescription drug management.        Final diagnoses:   Proctocolitis without complication   Bloody diarrhea   Elevated blood pressure reading       ED Disposition  ED Disposition       ED Disposition   Discharge    Condition   Stable    Comment   --               [facility] EMERGENCY DEPARTMENT  [address] [facility] [id number] [phone]    As needed, If symptoms worsen or ANY concerns.    Chris Parrish MD  [address] [id number] [phone]      As soon as possible.         Medication List        New Prescriptions      acetaminophen 500 MG tablet  Commonly known as: TYLENOL  Take 2 tablets by mouth Every 6 (Six) Hours As Needed for Mild Pain or Moderate Pain.     hyoscyamine 0.125 MG SL tablet  Commonly known as: LEVSIN  Take 1 tablet by mouth Every 4 (Four)  Hours As Needed for Cramping.               Where to Get Your Medications        These medications were sent to [facility]/pharmacy #[id number] - [place], [place] - [address] - [phone]  - [phone]   [address], [facility] [address]      Hours: 24-hours Phone: [phone]   acetaminophen 500 MG tablet  hyoscyamine 0.125 MG SL tablet            Migel Yin MD  08/19/23 1710

## 2023-08-24 ENCOUNTER — OFFICE VISIT (OUTPATIENT)
Dept: GASTROENTEROLOGY | Facility: CLINIC | Age: 48
End: 2023-08-24
Payer: COMMERCIAL

## 2023-08-24 VITALS
BODY MASS INDEX: 28.94 KG/M2 | TEMPERATURE: 97.1 F | DIASTOLIC BLOOD PRESSURE: 93 MMHG | WEIGHT: 196 LBS | OXYGEN SATURATION: 100 % | HEART RATE: 67 BPM | SYSTOLIC BLOOD PRESSURE: 165 MMHG | RESPIRATION RATE: 20 BRPM

## 2023-08-24 DIAGNOSIS — R09.89 GLOBUS SENSATION: ICD-10-CM

## 2023-08-24 DIAGNOSIS — R13.19 ESOPHAGEAL DYSPHAGIA: ICD-10-CM

## 2023-08-24 DIAGNOSIS — R93.89 ABNORMAL CAT SCAN: ICD-10-CM

## 2023-08-24 DIAGNOSIS — R10.9 ABDOMINAL CRAMPING: ICD-10-CM

## 2023-08-24 DIAGNOSIS — R10.13 EPIGASTRIC PAIN: Primary | ICD-10-CM

## 2023-08-24 DIAGNOSIS — R19.7 BLOODY DIARRHEA: ICD-10-CM

## 2023-08-24 RX ORDER — FERROUS SULFATE 325(65) MG
325 TABLET ORAL DAILY
COMMUNITY
Start: 2023-02-28

## 2023-08-24 NOTE — LETTER
August 24, 2023       No Recipients    Patient: Lisa Martin   YOB: 1975   Date of Visit: 8/24/2023     Dear Ana Carias MD:       Thank you for referring Lisa Martin to me for evaluation. Below are the relevant portions of my assessment and plan of care.    If you have questions, please do not hesitate to call me. I look forward to following Lisa along with you.         Sincerely,        Chris Parrish MD        CC:   No Recipients    Chris Parrish MD  08/24/23 6847  Sign when Signing Visit  PCP:  Provider, No Known     Ana Carias MD  [address] [facility]  [address]  [place] [address]    Chief Complaint   Patient presents with    Bloated    Abdominal Cramping    Follow-up     New patient follow up from er        HPI   The patient is a 48-year-old female began with troubles last fall.  It sounds like most of her troubles revolved around GYN issues.  She subsequently had a total abdominal hysterectomy bilateral salpingo-oophorectomy.  She still has troubles with bloating.  Minimal sounds like this is gas as it can vary throughout the day.    She has a second problem where she has a globus sensation.  She also has difficulty swallowing at times.  It can be to croutons or solid foods.  She has to drink water to get the food down.    More recently on 8/19/23 she had fairly sudden onset bloody diarrhea.  She was seen in the emergency room.  She had an occult positive blood which was positive which would not be surprising.  Lactic acid level was normal.  Gastrointestinal panel was negative.  I cannot see it C. difficile done.  Lipase was normal.  CMP showed no elevations in her liver chemistries.  Hemoglobin was 11.8 hematocrit 38.7.  White count 7.8.  CT showed mild proctocolitis from the transverse colon through the rectum.  She had a colonoscopy done 7/2/2021.  This was done at the [facility] and I have copy.  It showed small  internal hemorrhoids but otherwise was normal.  Her diarrhea is improving.  Her stools are now semiformed.  She did not have any today.  It sounds like she had one yesterday.    Allergies   Allergen Reactions    Aspirin Other (See Comments)     Pt has Von Willebrand disease     Ibuprofen Other (See Comments)     Pt has Von Willebrand disease           Current Outpatient Medications:     estradiol (ESTRACE VAGINAL) 0.1 MG/GM vaginal cream, Insert 1 gm intravaginally 2 times each week for 3wk and then weekly thereafter, Disp: 1 each, Rfl: 12    ferrous sulfate 325 (65 FE) MG tablet, Take 1 tablet by mouth Daily., Disp: , Rfl:     acetaminophen (TYLENOL) 500 MG tablet, Take 2 tablets by mouth Every 6 (Six) Hours As Needed for Mild Pain or Moderate Pain. (Patient not taking: Reported on 8/24/2023), Disp: 30 tablet, Rfl: 0    buPROPion XL (Wellbutrin XL) 150 MG 24 hr tablet, Take 1 tablet by mouth Every Morning. (Patient not taking: Reported on 8/24/2023), Disp: 30 tablet, Rfl: 2    docusate sodium 100 MG capsule, Take 1 capsule by mouth 2 (Two) Times a Day., Disp: 60 capsule, Rfl: 2    Ferrous Sulfate (IRON PO), Take  by mouth. (Patient not taking: Reported on 8/24/2023), Disp: , Rfl:     hyoscyamine (LEVSIN) 0.125 MG SL tablet, Take 1 tablet by mouth Every 4 (Four) Hours As Needed for Cramping., Disp: 20 tablet, Rfl: 0     Past Medical History:   Diagnosis Date    Anemia 10/2022    Bleeding disorder 1982    Von Willibrand's, Factor 8, Factor 12    Cervical dysplasia     Factor VIII (functional) deficiency     past few months at , tests have shown she no longer has this    Factor XII deficiency     past few months at , tests have shown she no longer has this    History of abnormal uterine bleeding     History of vertigo     Irritable bowel syndrome     VIC (obstructive sleep apnea)     mild, no cpap    Ovarian cyst     Pelvic prolapse     Shortness of breath on exertion     due to anemia    Von  Willebrand disease     diagnosed at age 6, past few months at , tests have shown she no longer has this       Past Surgical History:   Procedure Laterality Date    BREAST BIOPSY Right 2008    x 2    BREAST EXCISIONAL BIOPSY Right 2007    x 2    BREAST LUMPECTOMY Right 2008    BENIGN    COLONOSCOPY      COLPOSCOPY  2005    CYSTOSCOPY Bilateral 06/16/2023    Procedure: CYSTOSCOPY TEMPORARY PLACEMENT BILATERAL URETERAL CATHETER;  Surgeon: Emmanuel Mejía MD;  Location:  [facility];  Service: Urology;  Laterality: Bilateral;    ENDOSCOPIC FUNCTIONAL SINUS SURGERY (FESS) Bilateral 01/08/2019    Procedure: BILATERAL TOTAL ETHMOIDECTOMY,BILATERAL FRONTAL SINUPLASTY WITH TISSUE REMOVAL, BILATERAL MAXILLARY SINUPLASTY WITH TISSUE REMOVAL;  Surgeon: Jose Camacho MD;  Location:  [facility];  Service: ENT    FERTILITY SURGERY      1 round IUI with Jules    GYNECOLOGIC CRYOSURGERY  11/2005    HERNIA REPAIR      age 6- inguinal - unsure which side - no mesh in place    INGUINAL HERNIA REPAIR  1981    LAPAROSCOPIC ASSISTED VAGINAL HYSTERECTOMY SALPINGO OOPHORECTOMY  6/2023    POSTERIOR VAGINAL REPAIR N/A 06/16/2023    Procedure: POSTERIOR REPAIR, PERINEAL REPAIR;  Surgeon: Ana Carias MD;  Location:  [facility];  Service: Obstetrics/Gynecology;  Laterality: N/A;    TOTAL LAPAROSCOPIC HYSTERECTOMY SALPINGECTOMY N/A 06/16/2023    Procedure: TOTAL LAPAROSCOPIC HYSTERECTOMY BILATERAL SALPINGECTOMY;  Surgeon: Ana Carias MD;  Location:  [facility];  Service: Obstetrics/Gynecology;  Laterality: N/A;    URETHERAL RE-IMPLANTATION  AGE 4    VAGINAL PROLAPSE REPAIR  6/2023    WISDOM TOOTH EXTRACTION          Social History     Socioeconomic History    Marital status:    Tobacco Use    Smoking status: Never    Smokeless tobacco: Never   Vaping Use    Vaping Use: Never used   Substance and Sexual Activity    Alcohol use: Not Currently     Comment: 0-1x monthly    Drug use: No     Sexual activity: Yes     Partners: Male     Birth control/protection: Other     Comment: 's vasectomy        Family History   Problem Relation Age of Onset    Hypertension Father     Lymphoma Father         non-Hodgkins    Hypothyroidism Mother     Hyperlipidemia Mother     Stroke Mother     Breast cancer Neg Hx     Ovarian cancer Neg Hx     Colon cancer Neg Hx         Review of Systems     Vitals:    08/24/23 1457   BP: 165/93   Pulse: 67   Resp: 20   Temp: 97.1 øF (36.2 øC)   SpO2: 100%        Physical Exam  Constitutional:       General: She is not in acute distress.     Appearance: Normal appearance. She is not ill-appearing.   Abdominal:      Palpations: Abdomen is soft.   Neurological:      Mental Status: She is alert.        Diagnoses and all orders for this visit:    1. Epigastric pain (Primary)  -     US Liver; Future    2. Globus sensation    3. Esophageal dysphagia    4. Bloody diarrhea    5. Abdominal cramping    6. Abnormal CAT scan    Impressions and plan #1 globus sensation and solid food dysphagia: Sometimes this is related to reflux.  I have seen eosinophilic esophagitis cause similar symptoms.  Certainly is not normal to have dysphagia.  I am going to suggest an upper endoscopy to evaluate.  She may need a proton pump inhibitor.  We will check her for iliac disease at that time      #2 bloody stools and abdominal cramping: This was an acute event and appears to be resolving.  Infectious colitis can do this.  All the studies were negative although they did not do a C. difficile.  She has had antibiotics in the past 2 months.  She is getting better however.  We are going to hold off on sending a C. difficile test.  Her stool is forming and the lab will not do a C. difficile test on formed stool.  If her diarrhea returns we will check a C. difficile toxin.  Another possibility is ischemic colitis.  90% of the time this is over the age of 60 however.  I cannot get a history that she was  lightheaded or hypotensive.  This could also cause thickening on CAT scan.  This usually gets better on its own as well.  I do not want to do a colonoscopy at this point as she they have inflammation if she had a previous infection.  This will confuse the issue.  Both of the above typically get better on their own.  If she does not get better we will evaluate with a colonoscopy.    #3 bloating and gas: I gave her a FODMAP diet and we talked about gas in the GI tract.  I gave her a brochure about gas in the GI tract as well.  If she continues to have trouble we will check a carbon 13 sucrose breath test to see if she has a deficiency there.    Chris Parrish MD

## 2023-08-24 NOTE — PROGRESS NOTES
PCP:  Provider, No Known     Ana Carias MD  [address] [facility]  [address]  [place] [id number]    Chief Complaint   Patient presents with    Bloated    Abdominal Cramping    Follow-up     New patient follow up from er        HPI   The patient is a 48-year-old female began with troubles last fall.  It sounds like most of her troubles revolved around GYN issues.  She subsequently had a total abdominal hysterectomy bilateral salpingo-oophorectomy.  She still has troubles with bloating.  Minimal sounds like this is gas as it can vary throughout the day.    She has a second problem where she has a globus sensation.  She also has difficulty swallowing at times.  It can be to croutons or solid foods.  She has to drink water to get the food down.    More recently on 8/19/23 she had fairly sudden onset bloody diarrhea.  She was seen in the emergency room.  She had an occult positive blood which was positive which would not be surprising.  Lactic acid level was normal.  Gastrointestinal panel was negative.  I cannot see it C. difficile done.  Lipase was normal.  CMP showed no elevations in her liver chemistries.  Hemoglobin was 11.8 hematocrit 38.7.  White count 7.8.  CT showed mild proctocolitis from the transverse colon through the rectum.  She had a colonoscopy done 7/2/2021.  This was done at the [facility] and I have copy.  It showed small internal hemorrhoids but otherwise was normal.  Her diarrhea is improving.  Her stools are now semiformed.  She did not have any today.  It sounds like she had one yesterday.    Allergies   Allergen Reactions    Aspirin Other (See Comments)     Pt has Von Willebrand disease     Ibuprofen Other (See Comments)     Pt has Von Willebrand disease           Current Outpatient Medications:     estradiol (ESTRACE VAGINAL) 0.1 MG/GM vaginal cream, Insert 1 gm intravaginally 2 times each week for 3wk and then weekly thereafter, Disp: 1 each, Rfl: 12    ferrous sulfate 325  (65 FE) MG tablet, Take 1 tablet by mouth Daily., Disp: , Rfl:     acetaminophen (TYLENOL) 500 MG tablet, Take 2 tablets by mouth Every 6 (Six) Hours As Needed for Mild Pain or Moderate Pain. (Patient not taking: Reported on 8/24/2023), Disp: 30 tablet, Rfl: 0    buPROPion XL (Wellbutrin XL) 150 MG 24 hr tablet, Take 1 tablet by mouth Every Morning. (Patient not taking: Reported on 8/24/2023), Disp: 30 tablet, Rfl: 2    docusate sodium 100 MG capsule, Take 1 capsule by mouth 2 (Two) Times a Day., Disp: 60 capsule, Rfl: 2    Ferrous Sulfate (IRON PO), Take  by mouth. (Patient not taking: Reported on 8/24/2023), Disp: , Rfl:     hyoscyamine (LEVSIN) 0.125 MG SL tablet, Take 1 tablet by mouth Every 4 (Four) Hours As Needed for Cramping., Disp: 20 tablet, Rfl: 0     Past Medical History:   Diagnosis Date    Anemia 10/2022    Bleeding disorder 1982    Von Willibrand's, Factor 8, Factor 12    Cervical dysplasia     Factor VIII (functional) deficiency     past few months at , tests have shown she no longer has this    Factor XII deficiency     past few months at , tests have shown she no longer has this    History of abnormal uterine bleeding     History of vertigo     Irritable bowel syndrome     VIC (obstructive sleep apnea)     mild, no cpap    Ovarian cyst     Pelvic prolapse     Shortness of breath on exertion     due to anemia    Von Willebrand disease     diagnosed at age 6, past few months at , tests have shown she no longer has this       Past Surgical History:   Procedure Laterality Date    BREAST BIOPSY Right 2008    x 2    BREAST EXCISIONAL BIOPSY Right 2007    x 2    BREAST LUMPECTOMY Right 2008    BENIGN    COLONOSCOPY      COLPOSCOPY  2005    CYSTOSCOPY Bilateral 06/16/2023    Procedure: CYSTOSCOPY TEMPORARY PLACEMENT BILATERAL URETERAL CATHETER;  Surgeon: Emmanuel Mejía MD;  [facility]  Service: Urology;  Laterality: Bilateral;    ENDOSCOPIC FUNCTIONAL SINUS SURGERY (FESS) Bilateral  01/08/2019    Procedure: BILATERAL TOTAL ETHMOIDECTOMY,BILATERAL FRONTAL SINUPLASTY WITH TISSUE REMOVAL, BILATERAL MAXILLARY SINUPLASTY WITH TISSUE REMOVAL;  Surgeon: Jose Camacho MD;  Location:  [facility];  Service: ENT    FERTILITY SURGERY      1 round IUI with Akin    GYNECOLOGIC CRYOSURGERY  11/2005    HERNIA REPAIR      age 6- inguinal - unsure which side - no mesh in place    INGUINAL HERNIA REPAIR  1981    LAPAROSCOPIC ASSISTED VAGINAL HYSTERECTOMY SALPINGO OOPHORECTOMY  6/2023    POSTERIOR VAGINAL REPAIR N/A 06/16/2023    Procedure: POSTERIOR REPAIR, PERINEAL REPAIR;  Surgeon: Ana Carias MD;  Location:  [facility];  Service: Obstetrics/Gynecology;  Laterality: N/A;    TOTAL LAPAROSCOPIC HYSTERECTOMY SALPINGECTOMY N/A 06/16/2023    Procedure: TOTAL LAPAROSCOPIC HYSTERECTOMY BILATERAL SALPINGECTOMY;  Surgeon: Ana Carias MD;  Location:  [facility];  Service: Obstetrics/Gynecology;  Laterality: N/A;    URETHERAL RE-IMPLANTATION  AGE 4    VAGINAL PROLAPSE REPAIR  6/2023    WISDOM TOOTH EXTRACTION          Social History     Socioeconomic History    Marital status:    Tobacco Use    Smoking status: Never    Smokeless tobacco: Never   Vaping Use    Vaping Use: Never used   Substance and Sexual Activity    Alcohol use: Not Currently     Comment: 0-1x monthly    Drug use: No    Sexual activity: Yes     Partners: Male     Birth control/protection: Other     Comment: 's vasectomy        Family History   Problem Relation Age of Onset    Hypertension Father     Lymphoma Father         non-Hodgkins    Hypothyroidism Mother     Hyperlipidemia Mother     Stroke Mother     Breast cancer Neg Hx     Ovarian cancer Neg Hx     Colon cancer Neg Hx         Review of Systems     Vitals:    08/24/23 1457   BP: 165/93   Pulse: 67   Resp: 20   Temp: 97.1 øF (36.2 øC)   SpO2: 100%        Physical Exam  Constitutional:       General: She is not in acute distress.     Appearance: Normal  appearance. She is not ill-appearing.   Abdominal:      Palpations: Abdomen is soft.   Neurological:      Mental Status: She is alert.        Diagnoses and all orders for this visit:    1. Epigastric pain (Primary)  -     US Liver; Future    2. Globus sensation    3. Esophageal dysphagia    4. Bloody diarrhea    5. Abdominal cramping    6. Abnormal CAT scan    Impressions and plan #1 globus sensation and solid food dysphagia: Sometimes this is related to reflux.  I have seen eosinophilic esophagitis cause similar symptoms.  Certainly is not normal to have dysphagia.  I am going to suggest an upper endoscopy to evaluate.  She may need a proton pump inhibitor.  We will check her for iliac disease at that time      #2 bloody stools and abdominal cramping: This was an acute event and appears to be resolving.  Infectious colitis can do this.  All the studies were negative although they did not do a C. difficile.  She has had antibiotics in the past 2 months.  She is getting better however.  We are going to hold off on sending a C. difficile test.  Her stool is forming and the lab will not do a C. difficile test on formed stool.  If her diarrhea returns we will check a C. difficile toxin.  Another possibility is ischemic colitis.  90% of the time this is over the age of 60 however.  I cannot get a history that she was lightheaded or hypotensive.  This could also cause thickening on CAT scan.  This usually gets better on its own as well.  I do not want to do a colonoscopy at this point as she they have inflammation if she had a previous infection.  This will confuse the issue.  Both of the above typically get better on their own.  If she does not get better we will evaluate with a colonoscopy.    #3 bloating and gas: I gave her a FODMAP diet and we talked about gas in the GI tract.  I gave her a brochure about gas in the GI tract as well.  If she continues to have trouble we will check a carbon 13 sucrose breath test to  see if she has a deficiency there.    Chris Parrish MD

## 2023-08-29 ENCOUNTER — HOSPITAL ENCOUNTER (OUTPATIENT)
Dept: MAMMOGRAPHY | Facility: HOSPITAL | Age: 48
Discharge: HOME | End: 2023-08-29
Admitting: SURGERY
Payer: COMMERCIAL

## 2023-08-29 ENCOUNTER — HOSPITAL ENCOUNTER (OUTPATIENT)
Dept: ULTRASOUND IMAGING | Facility: HOSPITAL | Age: 48
Discharge: HOME | End: 2023-08-29
Payer: COMMERCIAL

## 2023-08-29 DIAGNOSIS — G83.81 BROWN-SEQUARD SYNDROME: ICD-10-CM

## 2023-08-29 DIAGNOSIS — Q83.8 ECTOPIC BREAST TISSUE: ICD-10-CM

## 2023-08-29 PROCEDURE — 76642 ULTRASOUND BREAST LIMITED: CPT

## 2023-08-29 PROCEDURE — 77066 DX MAMMO INCL CAD BI: CPT

## 2023-08-29 PROCEDURE — G0279 TOMOSYNTHESIS, MAMMO: HCPCS

## 2023-10-27 ENCOUNTER — HOSPITAL ENCOUNTER (OUTPATIENT)
Dept: ULTRASOUND IMAGING | Facility: HOSPITAL | Age: 48
Discharge: HOME | End: 2023-10-27
Admitting: INTERNAL MEDICINE
Payer: COMMERCIAL

## 2023-10-27 ENCOUNTER — OUTSIDE FACILITY SERVICE (OUTPATIENT)
Dept: GASTROENTEROLOGY | Facility: CLINIC | Age: 48
End: 2023-10-27
Payer: COMMERCIAL

## 2023-10-27 DIAGNOSIS — R10.13 EPIGASTRIC PAIN: ICD-10-CM

## 2023-10-27 PROCEDURE — 76705 ECHO EXAM OF ABDOMEN: CPT

## 2023-10-27 PROCEDURE — 88305 TISSUE EXAM BY PATHOLOGIST: CPT

## 2023-10-30 ENCOUNTER — LAB REQUISITION (OUTPATIENT)
Dept: LAB | Facility: HOSPITAL | Age: 48
End: 2023-10-30
Payer: COMMERCIAL

## 2023-10-30 DIAGNOSIS — R09.A2 FOREIGN BODY SENSATION, THROAT: ICD-10-CM

## 2023-10-30 DIAGNOSIS — R13.10 DYSPHAGIA, UNSPECIFIED: ICD-10-CM

## 2023-10-30 DIAGNOSIS — K29.70 GASTRITIS, UNSPECIFIED, WITHOUT BLEEDING: ICD-10-CM

## 2023-10-31 LAB — REF LAB TEST METHOD: NORMAL

## 2023-11-14 RX ORDER — PANTOPRAZOLE SODIUM 40 MG/1
40 TABLET, DELAYED RELEASE ORAL DAILY
Qty: 90 TABLET | Refills: 3 | Status: SHIPPED | OUTPATIENT
Start: 2023-11-14

## 2023-11-14 NOTE — TELEPHONE ENCOUNTER
Called & spoke with patient regarding EGD results. Letter in chart read to patient. Patient states she's still having the same issues. Says she & Dr. Parrish spoke about putting her on a medication but never revisited the conversation. Were you wanting patient to start on a PPI? Please advise!

## 2023-11-14 NOTE — TELEPHONE ENCOUNTER
"  Caller: Lisa Martin \"Sara\"    Relationship: Self    Best call back number: [phone]     What is the best time to reach you: ANY TIME    Who are you requesting to speak with (clinical staff, provider,  specific staff member): CLINICAL     Do you know the name of the person who called: IVY    What was the call regarding: TEST RESULTS          "

## 2023-11-28 ENCOUNTER — LAB (OUTPATIENT)
Age: 48
End: 2023-11-28
Payer: COMMERCIAL

## 2023-11-28 ENCOUNTER — OFFICE VISIT (OUTPATIENT)
Age: 48
End: 2023-11-28
Payer: COMMERCIAL

## 2023-11-28 VITALS
SYSTOLIC BLOOD PRESSURE: 124 MMHG | HEART RATE: 51 BPM | BODY MASS INDEX: 30.36 KG/M2 | HEIGHT: 69 IN | OXYGEN SATURATION: 99 % | WEIGHT: 205 LBS | DIASTOLIC BLOOD PRESSURE: 80 MMHG

## 2023-11-28 DIAGNOSIS — R53.83 FATIGUE, UNSPECIFIED TYPE: ICD-10-CM

## 2023-11-28 DIAGNOSIS — R73.03 PREDIABETES: ICD-10-CM

## 2023-11-28 DIAGNOSIS — Z13.220 ENCOUNTER FOR LIPID SCREENING FOR CARDIOVASCULAR DISEASE: ICD-10-CM

## 2023-11-28 DIAGNOSIS — J02.0 STREP PHARYNGITIS: Primary | ICD-10-CM

## 2023-11-28 DIAGNOSIS — R53.83 OTHER FATIGUE: ICD-10-CM

## 2023-11-28 DIAGNOSIS — Z13.6 SCREENING FOR ISCHEMIC HEART DISEASE: ICD-10-CM

## 2023-11-28 DIAGNOSIS — F43.21 ADJUSTMENT DISORDER WITH DEPRESSED MOOD: ICD-10-CM

## 2023-11-28 DIAGNOSIS — R73.03 DIABETES MELLITUS, LATENT: Primary | ICD-10-CM

## 2023-11-28 DIAGNOSIS — E55.9 AVITAMINOSIS D: ICD-10-CM

## 2023-11-28 DIAGNOSIS — Z13.6 ENCOUNTER FOR LIPID SCREENING FOR CARDIOVASCULAR DISEASE: ICD-10-CM

## 2023-11-28 DIAGNOSIS — Z13.220 SCREENING FOR LIPOID DISORDERS: ICD-10-CM

## 2023-11-28 DIAGNOSIS — E55.9 VITAMIN D DEFICIENCY: ICD-10-CM

## 2023-11-28 PROBLEM — Z48.89 POSTOPERATIVE VISIT: Status: RESOLVED | Noted: 2023-07-03 | Resolved: 2023-11-28

## 2023-11-28 PROBLEM — R93.89 THICKENED ENDOMETRIUM: Status: RESOLVED | Noted: 2021-12-20 | Resolved: 2023-11-28

## 2023-11-28 PROBLEM — N81.6 RECTOCELE: Status: RESOLVED | Noted: 2023-04-02 | Resolved: 2023-11-28

## 2023-11-28 PROBLEM — N93.9 ABNORMAL UTERINE BLEEDING (AUB): Status: RESOLVED | Noted: 2021-12-20 | Resolved: 2023-11-28

## 2023-11-28 PROBLEM — D68.00 VON WILLEBRAND DISEASE: Status: RESOLVED | Noted: 2018-07-02 | Resolved: 2023-11-28

## 2023-11-28 PROBLEM — N63.0 BREAST LUMP: Status: RESOLVED | Noted: 2021-06-08 | Resolved: 2023-11-28

## 2023-11-28 PROBLEM — N95.1 PERIMENOPAUSAL: Status: RESOLVED | Noted: 2022-03-21 | Resolved: 2023-11-28

## 2023-11-28 PROBLEM — D68.2 FACTOR XII DEFICIENCY: Status: ACTIVE | Noted: 2023-02-28

## 2023-11-28 PROBLEM — D68.2 FACTOR XII DEFICIENCY: Status: RESOLVED | Noted: 2023-02-28 | Resolved: 2023-11-28

## 2023-11-28 PROBLEM — N64.4 BREAST PAIN, RIGHT: Status: RESOLVED | Noted: 2023-04-19 | Resolved: 2023-11-28

## 2023-11-28 LAB
EXPIRATION DATE: ABNORMAL
EXPIRATION DATE: NORMAL
FLUAV AG UPPER RESP QL IA.RAPID: NOT DETECTED
FLUBV AG UPPER RESP QL IA.RAPID: NOT DETECTED
HBA1C MFR BLD: 6.3 % (ref 4.8–5.6)
INTERNAL CONTROL: ABNORMAL
INTERNAL CONTROL: NORMAL
Lab: ABNORMAL
Lab: NORMAL
S PYO AG THROAT QL: POSITIVE
SARS-COV-2 AG UPPER RESP QL IA.RAPID: NOT DETECTED

## 2023-11-28 PROCEDURE — 36415 COLL VENOUS BLD VENIPUNCTURE: CPT | Performed by: FAMILY MEDICINE

## 2023-11-28 PROCEDURE — 82607 VITAMIN B-12: CPT | Performed by: FAMILY MEDICINE

## 2023-11-28 PROCEDURE — 84439 ASSAY OF FREE THYROXINE: CPT | Performed by: FAMILY MEDICINE

## 2023-11-28 PROCEDURE — 80050 GENERAL HEALTH PANEL: CPT | Performed by: FAMILY MEDICINE

## 2023-11-28 PROCEDURE — 80061 LIPID PANEL: CPT | Performed by: FAMILY MEDICINE

## 2023-11-28 PROCEDURE — 83036 HEMOGLOBIN GLYCOSYLATED A1C: CPT | Performed by: FAMILY MEDICINE

## 2023-11-28 PROCEDURE — 82306 VITAMIN D 25 HYDROXY: CPT | Performed by: FAMILY MEDICINE

## 2023-11-28 RX ORDER — AMOXICILLIN 875 MG/1
875 TABLET, COATED ORAL EVERY 12 HOURS SCHEDULED
Qty: 20 TABLET | Refills: 0 | Status: SHIPPED | OUTPATIENT
Start: 2023-11-28 | End: 2023-12-08

## 2023-11-28 NOTE — PROGRESS NOTES
"Chief Complaint  New patient, Fatigue (Going on for years ), Nasal Congestion (1 week ), and Sore Throat (1 week)    Subjective        Lisa Martin presents to [facility]  Fatigue  Associated symptoms include abdominal pain, congestion, fatigue and a sore throat. Pertinent negatives include no chills, coughing or fever.   Sore Throat   Associated symptoms include abdominal pain and congestion. Pertinent negatives include no coughing or shortness of breath.     Prior to surgery this Summer she was seen at  hematology. Extensive work-up and tested negative for Von Willebrand's.     Onset 1 week ago felt bad one day. Still has nasal congestion. Low grade fever 1 day of 100.     She as always had fatigue for 10 years. Increase stress past 3.5 years. She has gained weight approximately 25 lbs. She tried intermittent fasting for 6 weeks and lost 10 lbs, but regained more. She had irregular vaginal bleeding and had hysterectomy. GI abdominal issues in upper abdomen with swelling. She was at ED for proctocolitis. She has follow-up colonoscopy next month. Fatigue is worse with GI problems. She falls asleep on couch.     She has varicose veins and looked into procedures in the past. Mother had hemorrhagic stroke and passed away a year ago.             Review of Systems   Constitutional:  Positive for fatigue. Negative for chills and fever.   HENT:  Positive for congestion, postnasal drip and sore throat. Negative for rhinorrhea.    Respiratory:  Negative for cough, shortness of breath and wheezing.    Gastrointestinal:  Positive for abdominal distention and abdominal pain.       Objective   Vital Signs:  /80   Pulse 51   Ht 175.3 cm (69.02\")   Wt 93 kg (205 lb)   SpO2 99%   BMI 30.26 kg/m²   Estimated body mass index is 30.26 kg/m² as calculated from the following:    Height as of this encounter: 175.3 cm (69.02\").    Weight as of this encounter: 93 kg (205 lb).   "           Physical Exam  Vitals reviewed.   Constitutional:       General: She is not in acute distress.     Appearance: She is obese. She is not ill-appearing.   HENT:      Nose: No congestion or rhinorrhea.      Mouth/Throat:      Pharynx: No pharyngeal swelling, oropharyngeal exudate or posterior oropharyngeal erythema.      Tonsils: No tonsillar exudate or tonsillar abscesses. 1+ on the right. 1+ on the left.   Cardiovascular:      Rate and Rhythm: Normal rate and regular rhythm.   Pulmonary:      Effort: Pulmonary effort is normal.      Breath sounds: Normal breath sounds.   Lymphadenopathy:      Cervical: No cervical adenopathy.   Neurological:      Mental Status: She is alert.   Psychiatric:         Attention and Perception: Attention normal.         Mood and Affect: Affect is tearful.         Behavior: Behavior normal. Behavior is cooperative.        Result Review :  The following data was reviewed by: Andria Hoff MD on 11/28/2023:  Common labs          6/17/2023    04:27 6/17/2023    11:36 8/1/2023    14:28 8/19/2023    10:41   Common Labs   Glucose 98    102    BUN 8    14    Creatinine 0.75    0.81    Sodium 137    139    Potassium 4.4    4.2    Chloride 106    106    Calcium 8.3    9.2    Albumin    3.8    Total Bilirubin    0.3    Alkaline Phosphatase    107    AST (SGOT)    24    ALT (SGPT)    25    WBC 11.60  13.51  8.10  7.38    Hemoglobin 8.2  7.9  11.6  11.8    Hematocrit 28.2  27.3  37.6  38.7    Platelets 334  317  315  260        TSH (11/03/2022 12:41)  T4, Free (11/03/2022 12:41)  Results for orders placed or performed in visit on 11/28/23   POCT SARS-CoV-2 + Flu Antigen JACQUI    Specimen: Swab   Result Value Ref Range    SARS Antigen Not Detected Not Detected, Presumptive Negative    Influenza A Antigen JACQUI Not Detected Not Detected    Influenza B Antigen JACQUI Not Detected Not Detected    Internal Control Passed Passed    Lot Number [id number]     Expiration Date 10/27/2024    POC Rapid  Strep A    Specimen: Swab   Result Value Ref Range    Rapid Strep A Screen Positive (A) Negative, VALID, INVALID, Not Performed    Internal Control Passed (A) Passed    Lot Number [id number]     Expiration Date 01/30/2026               Assessment and Plan   Diagnoses and all orders for this visit:    1. Strep pharyngitis (Primary)  -     POCT SARS-CoV-2 + Flu Antigen JACQUI  -     POC Rapid Strep A  -     amoxicillin (AMOXIL) 875 MG tablet; Take 1 tablet by mouth Every 12 (Twelve) Hours for 10 days.  Dispense: 20 tablet; Refill: 0  New.  Treat with amoxicillin.  2. Prediabetes  -     Hemoglobin A1c; Future  New diagnosis in the summer.  Check follow-up labs today.  3. Other fatigue  -     TSH; Future  -     T4, Free; Future  -     Vitamin B12; Future  -     CBC (No Diff); Future  -     Comprehensive Metabolic Panel; Future  Chronic fatigue.  Further evaluation with labs today.  Discussed could also be related to stress reaction.  4. Vitamin D deficiency  -     Vitamin D,25-Hydroxy; Future  Previously low.  Not currently on supplementation.  Check levels.  5. Adjustment disorder with depressed mood  -     Ambulatory Referral to Behavioral Health  [facility].  No history of anxiety or depression but she is presenting with symptoms of depression as a reaction to stress.  Discussed starting counseling.  Reassess at next visit and if not improved consider medications.  6. Encounter for lipid screening for cardiovascular disease  -     Lipid Panel; Future  Family history of stroke in her mother.            Follow Up   Return in about 6 weeks (around 1/8/2024) for Office visit chronic fatigue.  Patient was given instructions and counseling regarding her condition or for health maintenance advice. Please see specific information pulled into the AVS if appropriate.     Electronically signed by Andria Hoff MD, 11/28/23, 9:49 AM EST.

## 2023-11-29 ENCOUNTER — PATIENT MESSAGE (OUTPATIENT)
Age: 48
End: 2023-11-29
Payer: COMMERCIAL

## 2023-11-29 DIAGNOSIS — R73.03 PREDIABETES: Primary | ICD-10-CM

## 2023-11-29 PROBLEM — E55.9 VITAMIN D DEFICIENCY: Status: ACTIVE | Noted: 2023-11-29

## 2023-11-29 LAB
25(OH)D3 SERPL-MCNC: 22.1 NG/ML (ref 30–100)
ALBUMIN SERPL-MCNC: 4.5 G/DL (ref 3.5–5.2)
ALBUMIN/GLOB SERPL: 1.2 G/DL
ALP SERPL-CCNC: 115 U/L (ref 39–117)
ALT SERPL W P-5'-P-CCNC: 19 U/L (ref 1–33)
ANION GAP SERPL CALCULATED.3IONS-SCNC: 12 MMOL/L (ref 5–15)
AST SERPL-CCNC: 20 U/L (ref 1–32)
BILIRUB SERPL-MCNC: 0.5 MG/DL (ref 0–1.2)
BUN SERPL-MCNC: 15 MG/DL (ref 6–20)
BUN/CREAT SERPL: 16.9 (ref 7–25)
CALCIUM SPEC-SCNC: 10.1 MG/DL (ref 8.6–10.5)
CHLORIDE SERPL-SCNC: 104 MMOL/L (ref 98–107)
CHOLEST SERPL-MCNC: 167 MG/DL (ref 0–200)
CO2 SERPL-SCNC: 25 MMOL/L (ref 22–29)
CREAT SERPL-MCNC: 0.89 MG/DL (ref 0.57–1)
DEPRECATED RDW RBC AUTO: 41.8 FL (ref 37–54)
EGFRCR SERPLBLD CKD-EPI 2021: 80.1 ML/MIN/1.73
ERYTHROCYTE [DISTWIDTH] IN BLOOD BY AUTOMATED COUNT: 13.8 % (ref 12.3–15.4)
GLOBULIN UR ELPH-MCNC: 3.7 GM/DL
GLUCOSE SERPL-MCNC: 81 MG/DL (ref 65–99)
HCT VFR BLD AUTO: 43.6 % (ref 34–46.6)
HDLC SERPL-MCNC: 47 MG/DL (ref 40–60)
HGB BLD-MCNC: 13.7 G/DL (ref 12–15.9)
LDLC SERPL CALC-MCNC: 100 MG/DL (ref 0–100)
LDLC/HDLC SERPL: 2.09 {RATIO}
MCH RBC QN AUTO: 26.4 PG (ref 26.6–33)
MCHC RBC AUTO-ENTMCNC: 31.4 G/DL (ref 31.5–35.7)
MCV RBC AUTO: 84 FL (ref 79–97)
PLATELET # BLD AUTO: 345 10*3/MM3 (ref 140–450)
PMV BLD AUTO: 11.6 FL (ref 6–12)
POTASSIUM SERPL-SCNC: 4 MMOL/L (ref 3.5–5.2)
PROT SERPL-MCNC: 8.2 G/DL (ref 6–8.5)
RBC # BLD AUTO: 5.19 10*6/MM3 (ref 3.77–5.28)
SODIUM SERPL-SCNC: 141 MMOL/L (ref 136–145)
T4 FREE SERPL-MCNC: 1.21 NG/DL (ref 0.93–1.7)
TRIGL SERPL-MCNC: 108 MG/DL (ref 0–150)
TSH SERPL DL<=0.05 MIU/L-ACNC: 1.35 UIU/ML (ref 0.27–4.2)
VIT B12 BLD-MCNC: 343 PG/ML (ref 211–946)
VLDLC SERPL-MCNC: 20 MG/DL (ref 5–40)
WBC NRBC COR # BLD AUTO: 9.34 10*3/MM3 (ref 3.4–10.8)

## 2023-11-29 RX ORDER — ERGOCALCIFEROL 1.25 MG/1
50000 CAPSULE ORAL
Qty: 12 CAPSULE | Refills: 0 | Status: SHIPPED | OUTPATIENT
Start: 2023-11-29 | End: 2024-02-27

## 2023-12-04 ENCOUNTER — TELEPHONE (OUTPATIENT)
Dept: GASTROENTEROLOGY | Facility: CLINIC | Age: 48
End: 2023-12-04

## 2023-12-04 NOTE — TELEPHONE ENCOUNTER
"   Caller: Lisa Martin \"Sara\"    Relationship: Self    Best call back number:  [phone]    What form or medical record are you requesting: PREP PAPERWORK FOR COLONOSCOPY         How would you like to receive the form or medical records (pick-up, mail, fax): MAIL     If mail, what is the address: [address] [address]      Timeframe paperwork needed: ASAP    Additional notes: NEVER RECEIVED PAPERWORK FOR COLONOSCOPY           "

## 2023-12-12 ENCOUNTER — TELEPHONE (OUTPATIENT)
Age: 48
End: 2023-12-12

## 2023-12-12 RX ORDER — SODIUM, POTASSIUM,MAG SULFATES 17.5-3.13G
SOLUTION, RECONSTITUTED, ORAL ORAL
Qty: 354 ML | Refills: 0 | Status: SHIPPED | OUTPATIENT
Start: 2023-12-12

## 2023-12-12 NOTE — TELEPHONE ENCOUNTER
Called patient and LVM to see if [facility] has called. Left phone number in message if she wants to call them to schedule

## 2023-12-22 RX ORDER — BUPROPION HYDROCHLORIDE 150 MG/1
150 TABLET ORAL EVERY MORNING
Qty: 90 TABLET | OUTPATIENT
Start: 2023-12-22

## 2023-12-27 ENCOUNTER — OUTSIDE FACILITY SERVICE (OUTPATIENT)
Dept: GASTROENTEROLOGY | Facility: CLINIC | Age: 48
End: 2023-12-27
Payer: COMMERCIAL

## 2023-12-27 ENCOUNTER — LAB REQUISITION (OUTPATIENT)
Dept: LAB | Facility: HOSPITAL | Age: 48
End: 2023-12-27
Payer: COMMERCIAL

## 2023-12-27 DIAGNOSIS — Z12.11 ENCOUNTER FOR SCREENING FOR MALIGNANT NEOPLASM OF COLON: ICD-10-CM

## 2023-12-27 DIAGNOSIS — K62.5 HEMORRHAGE OF ANUS AND RECTUM: ICD-10-CM

## 2023-12-27 DIAGNOSIS — D12.2 BENIGN NEOPLASM OF ASCENDING COLON: ICD-10-CM

## 2023-12-27 PROCEDURE — 45385 COLONOSCOPY W/LESION REMOVAL: CPT | Performed by: INTERNAL MEDICINE

## 2023-12-27 PROCEDURE — 45380 COLONOSCOPY AND BIOPSY: CPT | Performed by: INTERNAL MEDICINE

## 2023-12-27 PROCEDURE — 88305 TISSUE EXAM BY PATHOLOGIST: CPT

## 2023-12-28 LAB — REF LAB TEST METHOD: NORMAL

## 2024-01-09 ENCOUNTER — TELEPHONE (OUTPATIENT)
Age: 49
End: 2024-01-09
Payer: COMMERCIAL

## 2024-06-05 ENCOUNTER — PATIENT ROUNDING (BHMG ONLY) (OUTPATIENT)
Dept: URGENT CARE | Facility: CLINIC | Age: 49
End: 2024-06-05
Payer: COMMERCIAL

## 2024-06-05 NOTE — ED NOTES
Thank you for letting us care for you in your recent visit to our urgent care center. We would love to hear about your experience with us. Was this the first time you have visited our location?    We’re always looking for ways to make our patients’ experiences even better. Do you have any recommendations on ways we may improve?     I appreciate you taking the time to respond. Please be on the lookout for a survey about your recent visit from [employer] via text or email. We would greatly appreciate if you could fill that out and turn it back in. We want your voice to be heard and we value your feedback.   Thank you for choosing [facility] for your healthcare needs.

## 2024-06-11 ENCOUNTER — TELEPHONE (OUTPATIENT)
Dept: GASTROENTEROLOGY | Facility: CLINIC | Age: 49
End: 2024-06-11

## 2024-06-11 NOTE — TELEPHONE ENCOUNTER
"Hub staff attempted to follow warm transfer process and was unsuccessful     Caller: Lisa Martin \"Sara\"    Relationship to patient: Self    Best call back number: [phone]    Patient is needing: PATIENT WOULD LIKE TO KNOW CAN SHE DO A MYCHART VISIT WITH DR CHI ON 6-20-24 SHE HAS A OFFICE VISIT SCHEDULED FOR 3:30 THAT DAY BUT HAS BEEN TOLD SHE WILL BE OUT OF TOWN. PLEASE CALL TO ADVISE        "

## 2024-06-20 ENCOUNTER — TELEMEDICINE (OUTPATIENT)
Dept: GASTROENTEROLOGY | Facility: CLINIC | Age: 49
End: 2024-06-20
Payer: COMMERCIAL

## 2024-06-20 DIAGNOSIS — R68.81 EARLY SATIETY: ICD-10-CM

## 2024-06-20 DIAGNOSIS — R14.0 BLOATING SYMPTOM: Primary | ICD-10-CM

## 2024-06-20 DIAGNOSIS — R14.3 INTESTINAL GAS EXCRETION: ICD-10-CM

## 2024-06-20 PROCEDURE — 99214 OFFICE O/P EST MOD 30 MIN: CPT | Performed by: INTERNAL MEDICINE

## 2024-06-20 NOTE — PROGRESS NOTES
PCP:  Andria Hoff MD     No referring provider defined for this encounter.    No chief complaint on file.     You have chosen to receive care through a telehealth visit.  Do you consent to use a video/audio connection for your medical care today? Yes   HPI   8-year-old female who is mainly complaining of bloating and belching.  She has increased intestinal gas as well.  She does have a sense of early satiety.  She does not have sulfur tasting burps.  She is tried a little of the FODMAP diet but feels like she needs some guidance if she is can delve into this for too much further.    Allergies   Allergen Reactions    Aspirin Other (See Comments)     Pt has Von Willebrand disease     Ibuprofen Other (See Comments)     Pt has Von Willebrand disease           Current Outpatient Medications:     estradiol (ESTRACE VAGINAL) 0.1 MG/GM vaginal cream, Insert 1 gm intravaginally 2 times each week for 3wk and then weekly thereafter, Disp: 1 each, Rfl: 12     Past Medical History:   Diagnosis Date    Anemia 10/2022    Bleeding disorder 1982    Von Willibrand's, Factor 8, Factor 12    Cervical dysplasia     Factor VIII (functional) deficiency     past few months at , tests have shown she no longer has this    Factor XII deficiency     past few months at , tests have shown she no longer has this    GERD (gastroesophageal reflux disease) 11/2023    History of abnormal uterine bleeding     History of vertigo     Iron deficiency anemia     Irritable bowel syndrome     VIC (obstructive sleep apnea)     mild, no cpap    Ovarian cyst     Pelvic prolapse     Prediabetes 06/2023    Shortness of breath on exertion     due to anemia    Vitamin D deficiency     Von Willebrand disease     diagnosed at age 6, past few months at , tests have shown she no longer has this       Past Surgical History:   Procedure Laterality Date    BREAST BIOPSY Right 2008    x 2    BREAST EXCISIONAL BIOPSY Right 2007    x 2    BREAST LUMPECTOMY  Right 2008    BENIGN    COLONOSCOPY      COLPOSCOPY  2005    CYSTOSCOPY Bilateral 06/16/2023    Procedure: CYSTOSCOPY TEMPORARY PLACEMENT BILATERAL URETERAL CATHETER;  Surgeon: Emmanuel Mejía MD;  Location:  [facility];  Service: Urology;  Laterality: Bilateral;    ENDOSCOPIC FUNCTIONAL SINUS SURGERY (FESS) Bilateral 01/08/2019    Procedure: BILATERAL TOTAL ETHMOIDECTOMY,BILATERAL FRONTAL SINUPLASTY WITH TISSUE REMOVAL, BILATERAL MAXILLARY SINUPLASTY WITH TISSUE REMOVAL;  Surgeon: Jose Camacho MD;  Location:  [facility];  Service: ENT    FERTILITY SURGERY      1 round IUI with Akin    GYNECOLOGIC CRYOSURGERY  11/2005    INGUINAL HERNIA REPAIR  1981    LAPAROSCOPIC ASSISTED VAGINAL HYSTERECTOMY SALPINGO OOPHORECTOMY  6/2023    POSTERIOR VAGINAL REPAIR N/A 06/16/2023    Procedure: POSTERIOR REPAIR, PERINEAL REPAIR;  Surgeon: Ana Carias MD;  Location:  [facility];  Service: Obstetrics/Gynecology;  Laterality: N/A;    SINUS SURGERY  2018    TOTAL LAPAROSCOPIC HYSTERECTOMY SALPINGECTOMY N/A 06/16/2023    Procedure: TOTAL LAPAROSCOPIC HYSTERECTOMY BILATERAL SALPINGECTOMY;  Surgeon: Ana Carias MD;  Location:  [facility];  Service: Obstetrics/Gynecology;  Laterality: N/A;    URETHERAL RE-IMPLANTATION  AGE 4    VAGINAL PROLAPSE REPAIR  6/2023    WISDOM TOOTH EXTRACTION          Social History     Socioeconomic History    Marital status:     Number of children: 2   Tobacco Use    Smoking status: Never     Passive exposure: Never    Smokeless tobacco: Never   Vaping Use    Vaping status: Never Used   Substance and Sexual Activity    Alcohol use: Not Currently     Comment: 0-1x monthly    Drug use: No    Sexual activity: Yes     Partners: Male     Birth control/protection: Other, Post-menopausal, Vasectomy        Family History   Problem Relation Age of Onset    Hypothyroidism Mother     Hyperlipidemia Mother     Stroke Mother 77    Hypertension Father     Lymphoma Father          non-Hodgkins    Other Father         Von Willebrands    Lung disease Maternal Grandmother     Breast cancer Neg Hx     Ovarian cancer Neg Hx     Colon cancer Neg Hx         Review of Systems     There were no vitals filed for this visit.     Physical Exam  Constitutional:       Appearance: Normal appearance.   Neurological:      Mental Status: She is alert.          Diagnoses and all orders for this visit:    1. Bloating symptom (Primary)    2. Early satiety    3. Intestinal gas excretion    Impressions and plan #1 bloating and gas: Sometimes this is dietary.  We again talked about the FODMAP diet briefly.  We are going to check a sucrose isomaltase test to see if she has a deficiency there.  We talked about this.  If this is negative probably get her to a dietitian and see if we can get her on a FODMAP diet a little more vigorously.  We will also consider a trial of Xifaxan or equivalent in case there is an element of bacterial overgrowth.  Gastric emptying is always a possibility.  She does not have all the symptoms.  She does have some tendency towards constipation at times and that is another possibility as well.  She did not have celiac disease on upper endoscopy.  She had an EGD 10/27/2023.  There was gastritis.  There was no H. pylori or celiac disease.  There was some reflux changes noted.Colonoscopy showed a small polyp which was adenomatous but otherwise no significant pathology was noted.  Random biopsies were negative.  We will see her back in follow-up.    Chris Parrish MD

## 2024-06-20 NOTE — LETTER
June 20, 2024       No Recipients    Patient: Lisa Martin   YOB: 1975   Date of Visit: 6/20/2024     Dear Andria Hoff MD:       Thank you for referring Lisa Martin to me for evaluation. Below are the relevant portions of my assessment and plan of care.    If you have questions, please do not hesitate to call me. I look forward to following Lisa along with you.         Sincerely,        Chris Parrish MD        CC:   No Recipients    Chris Parrish MD  06/20/24 1602  Sign when Signing Visit  PCP:  Andria Hoff MD     No referring provider defined for this encounter.    No chief complaint on file.     You have chosen to receive care through a telehealth visit.  Do you consent to use a video/audio connection for your medical care today? Yes   HPI   8-year-old female who is mainly complaining of bloating and belching.  She has increased intestinal gas as well.  She does have a sense of early satiety.  She does not have sulfur tasting burps.  She is tried a little of the FODMAP diet but feels like she needs some guidance if she is can delve into this for too much further.    Allergies   Allergen Reactions   • Aspirin Other (See Comments)     Pt has Von Willebrand disease    • Ibuprofen Other (See Comments)     Pt has Von Willebrand disease           Current Outpatient Medications:   •  estradiol (ESTRACE VAGINAL) 0.1 MG/GM vaginal cream, Insert 1 gm intravaginally 2 times each week for 3wk and then weekly thereafter, Disp: 1 each, Rfl: 12     Past Medical History:   Diagnosis Date   • Anemia 10/2022   • Bleeding disorder 1982    Von Willibrand's, Factor 8, Factor 12   • Cervical dysplasia    • Factor VIII (functional) deficiency     past few months at , tests have shown she no longer has this   • Factor XII deficiency     past few months at , tests have shown she no longer has this   • GERD (gastroesophageal reflux disease) 11/2023   • History of  abnormal uterine bleeding    • History of vertigo    • Iron deficiency anemia    • Irritable bowel syndrome    • VIC (obstructive sleep apnea)     mild, no cpap   • Ovarian cyst    • Pelvic prolapse    • Prediabetes 06/2023   • Shortness of breath on exertion     due to anemia   • Vitamin D deficiency    • Von Willebrand disease     diagnosed at age 6, past few months at , tests have shown she no longer has this       Past Surgical History:   Procedure Laterality Date   • BREAST BIOPSY Right 2008    x 2   • BREAST EXCISIONAL BIOPSY Right 2007    x 2   • BREAST LUMPECTOMY Right 2008    BENIGN   • COLONOSCOPY     • COLPOSCOPY  2005   • CYSTOSCOPY Bilateral 06/16/2023    Procedure: CYSTOSCOPY TEMPORARY PLACEMENT BILATERAL URETERAL CATHETER;  Surgeon: Emmanuel Mejía MD;  Location:  [facility];  Service: Urology;  Laterality: Bilateral;   • ENDOSCOPIC FUNCTIONAL SINUS SURGERY (FESS) Bilateral 01/08/2019    Procedure: BILATERAL TOTAL ETHMOIDECTOMY,BILATERAL FRONTAL SINUPLASTY WITH TISSUE REMOVAL, BILATERAL MAXILLARY SINUPLASTY WITH TISSUE REMOVAL;  Surgeon: Jose Camacho MD;  Location:  [facility];  Service: ENT   • FERTILITY SURGERY      1 round IUI with Jules   • GYNECOLOGIC CRYOSURGERY  11/2005   • INGUINAL HERNIA REPAIR  1981   • LAPAROSCOPIC ASSISTED VAGINAL HYSTERECTOMY SALPINGO OOPHORECTOMY  6/2023   • POSTERIOR VAGINAL REPAIR N/A 06/16/2023    Procedure: POSTERIOR REPAIR, PERINEAL REPAIR;  Surgeon: Ana Carias MD;  Location:  [facility];  Service: Obstetrics/Gynecology;  Laterality: N/A;   • SINUS SURGERY  2018   • TOTAL LAPAROSCOPIC HYSTERECTOMY SALPINGECTOMY N/A 06/16/2023    Procedure: TOTAL LAPAROSCOPIC HYSTERECTOMY BILATERAL SALPINGECTOMY;  Surgeon: Ana Carias MD;  Location:  [facility];  Service: Obstetrics/Gynecology;  Laterality: N/A;   • URETHERAL RE-IMPLANTATION  AGE 4   • VAGINAL PROLAPSE REPAIR  6/2023   • WISDOM TOOTH EXTRACTION          Social History      Socioeconomic History   • Marital status:    • Number of children: 2   Tobacco Use   • Smoking status: Never     Passive exposure: Never   • Smokeless tobacco: Never   Vaping Use   • Vaping status: Never Used   Substance and Sexual Activity   • Alcohol use: Not Currently     Comment: 0-1x monthly   • Drug use: No   • Sexual activity: Yes     Partners: Male     Birth control/protection: Other, Post-menopausal, Vasectomy        Family History   Problem Relation Age of Onset   • Hypothyroidism Mother    • Hyperlipidemia Mother    • Stroke Mother 77   • Hypertension Father    • Lymphoma Father         non-Hodgkins   • Other Father         Von Willebrands   • Lung disease Maternal Grandmother    • Breast cancer Neg Hx    • Ovarian cancer Neg Hx    • Colon cancer Neg Hx         Review of Systems     There were no vitals filed for this visit.     Physical Exam  Constitutional:       Appearance: Normal appearance.   Neurological:      Mental Status: She is alert.          Diagnoses and all orders for this visit:    1. Bloating symptom (Primary)    2. Early satiety    3. Intestinal gas excretion    Impressions and plan #1 bloating and gas: Sometimes this is dietary.  We again talked about the FODMAP diet briefly.  We are going to check a sucrose isomaltase test to see if she has a deficiency there.  We talked about this.  If this is negative probably get her to a dietitian and see if we can get her on a FODMAP diet a little more vigorously.  We will also consider a trial of Xifaxan or equivalent in case there is an element of bacterial overgrowth.  Gastric emptying is always a possibility.  She does not have all the symptoms.  She does have some tendency towards constipation at times and that is another possibility as well.  She did not have celiac disease on upper endoscopy.  She had an EGD 10/27/2023.  There was gastritis.  There was no H. pylori or celiac disease.  There was some reflux changes  noted.Colonoscopy showed a small polyp which was adenomatous but otherwise no significant pathology was noted.  Random biopsies were negative.  We will see her back in follow-up.    Chris Parrish MD

## 2024-07-29 ENCOUNTER — TELEMEDICINE (OUTPATIENT)
Dept: GASTROENTEROLOGY | Facility: CLINIC | Age: 49
End: 2024-07-29
Payer: COMMERCIAL

## 2024-07-29 DIAGNOSIS — R14.0 BLOATING SYMPTOM: Primary | ICD-10-CM

## 2024-07-29 DIAGNOSIS — R14.3 INTESTINAL GAS EXCRETION: ICD-10-CM

## 2024-07-29 PROCEDURE — 99214 OFFICE O/P EST MOD 30 MIN: CPT | Performed by: INTERNAL MEDICINE

## 2024-07-29 NOTE — PROGRESS NOTES
PCP:  Andria Hoff MD     No referring provider defined for this encounter.    Chief Complaint   Patient presents with    Bloated      You have chosen to receive care through a telehealth visit.  Do you consent to use a video/audio connection for your medical care today? Yes   HPI   The patient is known to me a 49-year-old female who is still having bloating and distention.  Upper endoscopy on 11/3/2023 showed gastritis and reflux esophagitis.  Was negative for H. pylori and negative for celiac disease.  On 12/27/2023 she had a tubular adenoma removed from the colon and biopsies were negative for microscopic colitis.  She has had an ultrasound which showed mild steatosis on 10/27/2023.  She had a CAT scan which showed some thickening of the colon on eight 1923 breath test (sucrase isomaltase) was negative.  She does get full right away.  She is wondering if she could have gastroparesis which we talked about in the past.  She is also wondering about small bowel bacterial overgrowth.    Allergies   Allergen Reactions    Aspirin Other (See Comments)     Pt has Von Willebrand disease     Ibuprofen Other (See Comments)     Pt has Von Willebrand disease           Current Outpatient Medications:     estradiol (ESTRACE VAGINAL) 0.1 MG/GM vaginal cream, Insert 1 gm intravaginally 2 times each week for 3wk and then weekly thereafter, Disp: 1 each, Rfl: 12    riFAXIMin (Xifaxan) 550 MG tablet, Take 1 tablet by mouth Every 8 (Eight) Hours., Disp: 42 tablet, Rfl: 0     Past Medical History:   Diagnosis Date    Anemia 10/2022    Bleeding disorder 1982    Von Willibrand's, Factor 8, Factor 12    Cervical dysplasia     Factor VIII (functional) deficiency     past few months at , tests have shown she no longer has this    Factor XII deficiency     past few months at , tests have shown she no longer has this    GERD (gastroesophageal reflux disease) 11/2023    History of abnormal uterine bleeding     History of vertigo      Iron deficiency anemia     Irritable bowel syndrome     VIC (obstructive sleep apnea)     mild, no cpap    Ovarian cyst     Pelvic prolapse     Prediabetes 06/2023    Shortness of breath on exertion     due to anemia    Vitamin D deficiency     Von Willebrand disease     diagnosed at age 6, past few months at , tests have shown she no longer has this       Past Surgical History:   Procedure Laterality Date    BREAST BIOPSY Right 2008    x 2    BREAST EXCISIONAL BIOPSY Right 2007    x 2    BREAST LUMPECTOMY Right 2008    BENIGN    COLONOSCOPY      COLPOSCOPY  2005    CYSTOSCOPY Bilateral 06/16/2023    Procedure: CYSTOSCOPY TEMPORARY PLACEMENT BILATERAL URETERAL CATHETER;  Surgeon: Emmanuel Mejía MD;  Location:  [facility];  Service: Urology;  Laterality: Bilateral;    ENDOSCOPIC FUNCTIONAL SINUS SURGERY (FESS) Bilateral 01/08/2019    Procedure: BILATERAL TOTAL ETHMOIDECTOMY,BILATERAL FRONTAL SINUPLASTY WITH TISSUE REMOVAL, BILATERAL MAXILLARY SINUPLASTY WITH TISSUE REMOVAL;  Surgeon: Jose Camacho MD;  Location:  [facility];  Service: ENT    FERTILITY SURGERY      1 round IUI with Akin    GYNECOLOGIC CRYOSURGERY  11/2005    INGUINAL HERNIA REPAIR  1981    LAPAROSCOPIC ASSISTED VAGINAL HYSTERECTOMY SALPINGO OOPHORECTOMY  6/2023    POSTERIOR VAGINAL REPAIR N/A 06/16/2023    Procedure: POSTERIOR REPAIR, PERINEAL REPAIR;  Surgeon: Ana Carias MD;  Location: [facility];  Service: Obstetrics/Gynecology;  Laterality: N/A;    SINUS SURGERY  2018    TOTAL LAPAROSCOPIC HYSTERECTOMY SALPINGECTOMY N/A 06/16/2023    Procedure: TOTAL LAPAROSCOPIC HYSTERECTOMY BILATERAL SALPINGECTOMY;  Surgeon: Ana Carias MD;  Location:  [facility];  Service: Obstetrics/Gynecology;  Laterality: N/A;    URETHERAL RE-IMPLANTATION  AGE 4    VAGINAL PROLAPSE REPAIR  6/2023    WISDOM TOOTH EXTRACTION          Social History     Socioeconomic History    Marital status:     Number of children: 2   Tobacco Use     Smoking status: Never     Passive exposure: Never    Smokeless tobacco: Never   Vaping Use    Vaping status: Never Used   Substance and Sexual Activity    Alcohol use: Not Currently     Comment: 0-1x monthly    Drug use: No    Sexual activity: Yes     Partners: Male     Birth control/protection: Other, Post-menopausal, Vasectomy        Family History   Problem Relation Age of Onset    Hypothyroidism Mother     Hyperlipidemia Mother     Stroke Mother 77    Hypertension Father     Lymphoma Father         non-Hodgkins    Other Father         Von Willebrands    Lung disease Maternal Grandmother     Breast cancer Neg Hx     Ovarian cancer Neg Hx     Colon cancer Neg Hx         Review of Systems     There were no vitals filed for this visit.     Physical Exam  Constitutional:       General: She is not in acute distress.     Appearance: Normal appearance. She is not ill-appearing.   Neurological:      Mental Status: She is alert.          Diagnoses and all orders for this visit:    1. Bloating symptom (Primary)  -     NM Gastric Emptying; Future    2. Intestinal gas excretion  -     NM Gastric Emptying; Future    Other orders  -     riFAXIMin (Xifaxan) 550 MG tablet; Take 1 tablet by mouth Every 8 (Eight) Hours.  Dispense: 42 tablet; Refill: 0    Impressions and plan 1 bloating and increased intestinal gas distention: We will check a gastric emptying study.  Her daughter apparently has a gastric emptying issue.  She does have early satiety as well.  We also talked about the possibility of a small bowel bacterial overgrowth and we have decided to give a trial of Xifaxan.  Hopefully she will get some improvement from that standpoint.  We will see her back in follow-up.    Chris Parrish MD

## 2024-07-30 ENCOUNTER — PRIOR AUTHORIZATION (OUTPATIENT)
Dept: GASTROENTEROLOGY | Facility: CLINIC | Age: 49
End: 2024-07-30
Payer: COMMERCIAL

## 2024-07-30 NOTE — TELEPHONE ENCOUNTER
Prior authorization for Xifaxan 550mg submitted through cover my meds.  Approved PA# [id number]  Effective 7/30/24-7/30/25

## 2024-09-03 ENCOUNTER — TRANSCRIBE ORDERS (OUTPATIENT)
Dept: ADMINISTRATIVE | Facility: HOSPITAL | Age: 49
End: 2024-09-03
Payer: COMMERCIAL

## 2024-09-03 DIAGNOSIS — Z12.31 SCREENING MAMMOGRAM FOR BREAST CANCER: Primary | ICD-10-CM

## 2024-09-09 ENCOUNTER — HOSPITAL ENCOUNTER (OUTPATIENT)
Dept: MAMMOGRAPHY | Facility: HOSPITAL | Age: 49
Discharge: HOME | End: 2024-09-09
Admitting: INTERNAL MEDICINE
Payer: COMMERCIAL

## 2024-09-09 DIAGNOSIS — Z12.31 SCREENING MAMMOGRAM FOR BREAST CANCER: ICD-10-CM

## 2024-09-09 PROCEDURE — 77067 SCR MAMMO BI INCL CAD: CPT

## 2024-09-09 PROCEDURE — 77063 BREAST TOMOSYNTHESIS BI: CPT

## 2024-09-10 ENCOUNTER — HOSPITAL ENCOUNTER (OUTPATIENT)
Dept: NUCLEAR MEDICINE | Facility: HOSPITAL | Age: 49
Discharge: HOME | End: 2024-09-10
Payer: COMMERCIAL

## 2024-09-10 DIAGNOSIS — R14.0 BLOATING SYMPTOM: ICD-10-CM

## 2024-09-10 DIAGNOSIS — R14.3 INTESTINAL GAS EXCRETION: ICD-10-CM

## 2024-09-10 PROCEDURE — 78264 GASTRIC EMPTYING IMG STUDY: CPT

## 2024-09-10 PROCEDURE — A9541 TC99M SULFUR COLLOID: HCPCS | Performed by: INTERNAL MEDICINE

## 2024-09-10 PROCEDURE — 77063 BREAST TOMOSYNTHESIS BI: CPT | Performed by: RADIOLOGY

## 2024-09-10 PROCEDURE — 0 TECHNETIUM SULFUR COLLOID: Performed by: INTERNAL MEDICINE

## 2024-09-10 PROCEDURE — 77067 SCR MAMMO BI INCL CAD: CPT | Performed by: RADIOLOGY

## 2024-09-10 RX ADMIN — TECHNETIUM TC 99M SULFUR COLLOID 1 DOSE: KIT at 09:36

## 2024-11-08 ENCOUNTER — TELEPHONE (OUTPATIENT)
Age: 49
End: 2024-11-08
Payer: COMMERCIAL

## 2024-11-08 NOTE — TELEPHONE ENCOUNTER
"    Caller: Lisa Martin \"Sara\"    Relationship: Self    Best call back number: [phone]     Who is your current provider: DR PARNELL    Is your current provider offboarding? NO    Who would you like your new provider to be: DR STODDARD    What are your reasons for transferring care: ONLY SEEN CURRENT PCP ONCE AND DR STODDARD WAS AT AN OLD PCP OFFICE SO MORE FAMILIAR     Additional notes: PLEASE CALL WHEN SWITCH APPROVED      "

## 2024-12-23 ENCOUNTER — LAB (OUTPATIENT)
Age: 49
End: 2024-12-23
Payer: COMMERCIAL

## 2024-12-23 ENCOUNTER — OFFICE VISIT (OUTPATIENT)
Age: 49
End: 2024-12-23
Payer: COMMERCIAL

## 2024-12-23 VITALS
HEIGHT: 69 IN | OXYGEN SATURATION: 98 % | SYSTOLIC BLOOD PRESSURE: 138 MMHG | DIASTOLIC BLOOD PRESSURE: 74 MMHG | HEART RATE: 81 BPM | BODY MASS INDEX: 30.6 KG/M2 | WEIGHT: 206.6 LBS

## 2024-12-23 DIAGNOSIS — I83.93 VARICOSE VEINS OF BOTH LOWER EXTREMITIES WITHOUT ULCER OR INFLAMMATION: ICD-10-CM

## 2024-12-23 DIAGNOSIS — K63.8219 SMALL INTESTINAL BACTERIAL OVERGROWTH (SIBO): ICD-10-CM

## 2024-12-23 DIAGNOSIS — R73.03 PREDIABETES: ICD-10-CM

## 2024-12-23 DIAGNOSIS — E55.9 VITAMIN D DEFICIENCY: ICD-10-CM

## 2024-12-23 DIAGNOSIS — E66.09 CLASS 1 OBESITY DUE TO EXCESS CALORIES WITHOUT SERIOUS COMORBIDITY WITH BODY MASS INDEX (BMI) OF 30.0 TO 30.9 IN ADULT: ICD-10-CM

## 2024-12-23 DIAGNOSIS — Z00.00 HEALTHCARE MAINTENANCE: ICD-10-CM

## 2024-12-23 DIAGNOSIS — J32.0 CHRONIC MAXILLARY SINUSITIS: ICD-10-CM

## 2024-12-23 DIAGNOSIS — R14.0 BLOATING: ICD-10-CM

## 2024-12-23 DIAGNOSIS — R73.03 PREDIABETES: Primary | ICD-10-CM

## 2024-12-23 DIAGNOSIS — E66.811 CLASS 1 OBESITY DUE TO EXCESS CALORIES WITHOUT SERIOUS COMORBIDITY WITH BODY MASS INDEX (BMI) OF 30.0 TO 30.9 IN ADULT: ICD-10-CM

## 2024-12-23 DIAGNOSIS — R74.8 ELEVATED ALKALINE PHOSPHATASE LEVEL: Primary | ICD-10-CM

## 2024-12-23 DIAGNOSIS — G62.9 NEUROPATHY: ICD-10-CM

## 2024-12-23 PROBLEM — F52.0 HYPOACTIVE SEXUAL DESIRE DISORDER: Status: ACTIVE | Noted: 2024-12-23

## 2024-12-23 PROBLEM — Z90.710 H/O: HYSTERECTOMY: Status: ACTIVE | Noted: 2024-12-23

## 2024-12-23 PROBLEM — N95.8 GENITOURINARY SYNDROME OF MENOPAUSE: Status: ACTIVE | Noted: 2024-12-23

## 2024-12-23 PROBLEM — R53.82 CHRONIC FATIGUE: Status: RESOLVED | Noted: 2023-08-04 | Resolved: 2024-12-23

## 2024-12-23 PROBLEM — I10 ESSENTIAL HYPERTENSION: Status: ACTIVE | Noted: 2024-12-23

## 2024-12-23 LAB
DEPRECATED RDW RBC AUTO: 40.4 FL (ref 37–54)
ERYTHROCYTE [DISTWIDTH] IN BLOOD BY AUTOMATED COUNT: 12.5 % (ref 12.3–15.4)
EXPIRATION DATE: NORMAL
HBA1C MFR BLD: 5.5 % (ref 4.5–5.7)
HCT VFR BLD AUTO: 47.7 % (ref 34–46.6)
HGB BLD-MCNC: 16.3 G/DL (ref 12–15.9)
Lab: NORMAL
MCH RBC QN AUTO: 30.6 PG (ref 26.6–33)
MCHC RBC AUTO-ENTMCNC: 34.2 G/DL (ref 31.5–35.7)
MCV RBC AUTO: 89.5 FL (ref 79–97)
PLATELET # BLD AUTO: 316 10*3/MM3 (ref 140–450)
PMV BLD AUTO: 11.7 FL (ref 6–12)
RBC # BLD AUTO: 5.33 10*6/MM3 (ref 3.77–5.28)
WBC NRBC COR # BLD AUTO: 7.95 10*3/MM3 (ref 3.4–10.8)

## 2024-12-23 PROCEDURE — 82607 VITAMIN B-12: CPT | Performed by: STUDENT IN AN ORGANIZED HEALTH CARE EDUCATION/TRAINING PROGRAM

## 2024-12-23 PROCEDURE — 86803 HEPATITIS C AB TEST: CPT | Performed by: STUDENT IN AN ORGANIZED HEALTH CARE EDUCATION/TRAINING PROGRAM

## 2024-12-23 PROCEDURE — 99396 PREV VISIT EST AGE 40-64: CPT | Performed by: STUDENT IN AN ORGANIZED HEALTH CARE EDUCATION/TRAINING PROGRAM

## 2024-12-23 PROCEDURE — 80050 GENERAL HEALTH PANEL: CPT | Performed by: STUDENT IN AN ORGANIZED HEALTH CARE EDUCATION/TRAINING PROGRAM

## 2024-12-23 PROCEDURE — 80061 LIPID PANEL: CPT | Performed by: STUDENT IN AN ORGANIZED HEALTH CARE EDUCATION/TRAINING PROGRAM

## 2024-12-23 PROCEDURE — 99214 OFFICE O/P EST MOD 30 MIN: CPT | Performed by: STUDENT IN AN ORGANIZED HEALTH CARE EDUCATION/TRAINING PROGRAM

## 2024-12-23 PROCEDURE — 82306 VITAMIN D 25 HYDROXY: CPT | Performed by: STUDENT IN AN ORGANIZED HEALTH CARE EDUCATION/TRAINING PROGRAM

## 2024-12-23 PROCEDURE — 36415 COLL VENOUS BLD VENIPUNCTURE: CPT | Performed by: STUDENT IN AN ORGANIZED HEALTH CARE EDUCATION/TRAINING PROGRAM

## 2024-12-23 PROCEDURE — 83036 HEMOGLOBIN GLYCOSYLATED A1C: CPT | Performed by: STUDENT IN AN ORGANIZED HEALTH CARE EDUCATION/TRAINING PROGRAM

## 2024-12-23 RX ORDER — ESTRADIOL 0.03 MG/D
1 FILM, EXTENDED RELEASE TRANSDERMAL 2 TIMES WEEKLY
COMMUNITY

## 2024-12-23 RX ORDER — TRETINOIN 0.25 MG/G
1 CREAM TOPICAL NIGHTLY
COMMUNITY
Start: 2024-12-02

## 2024-12-23 RX ORDER — BETAMETHASONE VALERATE 1.2 MG/G
1 CREAM TOPICAL 2 TIMES DAILY
COMMUNITY

## 2024-12-23 RX ORDER — LOSARTAN POTASSIUM AND HYDROCHLOROTHIAZIDE 25; 100 MG/1; MG/1
1 TABLET ORAL DAILY
COMMUNITY
Start: 2024-09-18

## 2024-12-23 RX ORDER — ESTRADIOL 10 UG/1
1 TABLET VAGINAL 3 TIMES WEEKLY
COMMUNITY
Start: 2024-08-28

## 2024-12-23 NOTE — PROGRESS NOTES
Annual Health Maintenance Exam      HPI     Ms. Martin is here today for their annual visit.     History of Present Illness  The patient is a 49-year-old female here for an annual exam and to establish care.    Referred by Dr. Dean . Experiencing GI issues, including bloating, despite a healthy diet and hydration. Previously consulted Dr. Hogue at [facility] but seeking a new gastroenterologist.     Interested in intermittent fasting for insulin resistance. Experiences postprandial fatigue and is concerned about cortisol levels affecting GI issues. Significant stress since her mother's passing 2 years ago.       Attempted intermittent fasting years ago, resulting in a 10-pound weight loss but developed severe GI and gynecological issues, managed with a hysterectomy last year for fibroids.    Scheduled for varicose vein surgery on her left leg on 01/06/2025 and right leg on 01/10/2025. History of 2 pregnancies exacerbating varicose veins. First noticed varicose veins in her 30s at 135 pounds.       Experiencing chronic sinusitis with recurrent strep infections. Underwent sinus surgery in 2018 but symptoms recurred. Recent culture revealed staph infection. Using nasal rinse with gentamicin and referred to an immunologist.    Experiencing intermittent numbness, particularly in the left foot and arms. Occasional neck and back pain, uncertain if it radiates. Family history of autoimmune issues, nerve pain, and neuropathy in her sister. Daughter undergoing  testing. Considering consulting a rheumatologist or immunologist. History of bilateral radial head fractures.    Monitoring blood pressure at home. Prescribed losartan hydrochlorothiazide. Plans to resume regular monitoring with a home blood pressure monitor.    Requested recheck of vitamin D levels, previously found low. Taking a vitamin D supplement and considering ordering a new bottle if levels are normal.    Colonoscopy in 2021 at age 45 showed no  abnormalities. Subsequent colonoscopy last year revealed a polyp. Seeking a new gastroenterologist for regular monitoring. Advised to have a follow-up colonoscopy in 5 years.    History of breast surgery to remove lumps and biopsies, all negative. Needs new glasses but has no vision or hearing concerns. Wears a seatbelt and does not text while driving. Feels safe at home and is treated well by her .    SOCIAL HISTORY  She does not smoke, drink alcohol, or use drugs. She lives with her  and 2 daughters, aged 10 and 12. She is a SAHM and enjoys socializing with friends. Her physical activity is limited to walking her dogs.    FAMILY HISTORY  She does not have a family history of breast cancer or colon cancer. Her sister has autoimmune issues and experiences nerve pain and neuropathy.    MEDICATIONS  Current: losartan hydrochlorothiazide, omega-3, vitamin K2, D3 supplement, MiraLAX    IMMUNIZATIONS  She declined vaccines today as she is seeing immunology soon          Mood:  PHQ-2 Depression Screening  Little interest or pleasure in doing things? Not at all   Feeling down, depressed, or hopeless? Not at all   PHQ-2 Total Score 0       Past Medical History:  Patient Active Problem List   Diagnosis    Sebaceous cyst    Prediabetes    Vitamin D deficiency    Genitourinary syndrome of menopause    Hypoactive sexual desire disorder    Essential hypertension    H/O: hysterectomy    Class 1 obesity due to excess calories without serious comorbidity with body mass index (BMI) of 30.0 to 30.9 in adult    Varicose veins of both lower extremities without ulcer or inflammation    Neuropathy    Chronic maxillary sinusitis    Small intestinal bacterial overgrowth (SIBO)        Allergies:  Allergies   Allergen Reactions    Aspirin Other (See Comments)     Pt has Von Willebrand disease     Ibuprofen Other (See Comments)     Pt has Von Willebrand disease         Medications:    Current Outpatient Medications:      betamethasone valerate (VALISONE) 0.1 % cream, Apply 1 Application topically to the appropriate area as directed 2 (Two) Times a Day., Disp: , Rfl:     estradiol (VIVELLE-DOT) 0.025 MG/24HR patch, Place 1 patch on the skin as directed by provider 2 (Two) Times a Week., Disp: , Rfl:     losartan-hydrochlorothiazide (HYZAAR) 100-25 MG per tablet, Take 1 tablet by mouth Daily., Disp: , Rfl:     Polyethylene Glycol 3350 (MIRALAX PO), , Disp: , Rfl:     Testosterone 20 % cream, Apply 0.5ml (2 clicks) topically & rub in once daily as directed., Disp: , Rfl:     tretinoin (RETIN-A) 0.025 % cream, Apply 1 Application topically to the appropriate area as directed Every Night., Disp: , Rfl:     Yuvafem 10 MCG tablet vaginal tablet, Insert 1 tablet into the vagina 3 (Three) Times a Week., Disp: , Rfl:      Immunizations:  Immunization History   Administered Date(s) Administered    COVID-19 (MODERNA) 1st,2nd,3rd Dose Monovalent 03/09/2021, 04/06/2021    Fluzone (or Fluarix & Flulaval for VFC) >6mos 12/05/2020    Hepatitis A 12/17/2018    Influenza Injectable Mdck Pf Quad 09/27/2019    Influenza, Unspecified 10/05/2018        Surgical History:  Past Surgical History:   Procedure Laterality Date    BREAST BIOPSY Right 2008    x 2    BREAST EXCISIONAL BIOPSY Right 2007    x 2    BREAST LUMPECTOMY Right 2008    BENIGN    COLONOSCOPY      COLPOSCOPY  2005    CYSTOSCOPY Bilateral 06/16/2023    Procedure: CYSTOSCOPY TEMPORARY PLACEMENT BILATERAL URETERAL CATHETER;  Surgeon: Emmanuel Mejía MD;  Location:  [facility];  Service: Urology;  Laterality: Bilateral;    ENDOSCOPIC FUNCTIONAL SINUS SURGERY (FESS) Bilateral 01/08/2019    Procedure: BILATERAL TOTAL ETHMOIDECTOMY,BILATERAL FRONTAL SINUPLASTY WITH TISSUE REMOVAL, BILATERAL MAXILLARY SINUPLASTY WITH TISSUE REMOVAL;  Surgeon: Jose Camacho MD;  Location:  [facility];  Service: ENT    FERTILITY SURGERY      1 round IUI with Akin    GYNECOLOGIC CRYOSURGERY  11/2005    INGUINAL  HERNIA REPAIR  1981    LAPAROSCOPIC ASSISTED VAGINAL HYSTERECTOMY SALPINGO OOPHORECTOMY  6/2023    POSTERIOR VAGINAL REPAIR N/A 06/16/2023    Procedure: POSTERIOR REPAIR, PERINEAL REPAIR;  Surgeon: Ana Carias MD;  Location: [facility] OR;  Service: Obstetrics/Gynecology;  Laterality: N/A;    SINUS SURGERY  2018    TOTAL LAPAROSCOPIC HYSTERECTOMY SALPINGECTOMY N/A 06/16/2023    Procedure: TOTAL LAPAROSCOPIC HYSTERECTOMY BILATERAL SALPINGECTOMY;  Surgeon: Ana Carias MD;  Location: [facility] OR;  Service: Obstetrics/Gynecology;  Laterality: N/A;    URETHERAL RE-IMPLANTATION  AGE 4    VAGINAL PROLAPSE REPAIR  6/2023    WISDOM TOOTH EXTRACTION          Family History:  Family History   Problem Relation Age of Onset    Hypothyroidism Mother     Hyperlipidemia Mother     Stroke Mother 77    Hypertension Father     Lymphoma Father         non-Hodgkins    Other Father         Von Willebrands    Lung disease Maternal Grandmother     Breast cancer Neg Hx     Ovarian cancer Neg Hx     Colon cancer Neg Hx      Any change in family history since last annual visit: yes / no  Any family history of colon cancer, breast cancer, ovarian cancer, early CAD:       The following portions of the patient's chart were reviewed in this encounter and updated as appropriate: Past Medical History, Surgical History, Family History, and Social History    Over the last 2 weeks, how often have you been bothered by any of the following problems?  Little interest or pleasure in doing things: Not at all  Feeling down, depressed, or hopeless: Not at all    Objective      Vitals:    12/23/24 0910   BP: 138/74   Pulse: 81   SpO2: 98%               Physical Exam  Vitals reviewed.   Constitutional:       General: She is not in acute distress.     Appearance: Normal appearance. She is not ill-appearing.   Neck:      Comments: No goiter   Cardiovascular:      Rate and Rhythm: Normal rate and regular rhythm.      Pulses: Normal pulses.       Heart sounds: Normal heart sounds. No murmur heard.  Pulmonary:      Effort: Pulmonary effort is normal. No respiratory distress.      Breath sounds: Normal breath sounds.   Abdominal:      General: There is no distension.      Palpations: Abdomen is soft. There is no mass.      Tenderness: There is no abdominal tenderness. There is no guarding.   Musculoskeletal:      Right lower leg: No edema.      Left lower leg: No edema.   Lymphadenopathy:      Cervical: No cervical adenopathy.   Skin:     General: Skin is warm and dry.   Neurological:      General: No focal deficit present.      Mental Status: She is alert and oriented to person, place, and time.   Psychiatric:         Mood and Affect: Mood normal.         Behavior: Behavior normal.     }     Diagnoses and all orders for this visit:    1. Prediabetes (Primary)  -     POC Glycosylated Hemoglobin (Hb A1C)  -     Lipid Panel; Future    2. Vitamin D deficiency  -     Vitamin D 25 hydroxy; Future    3. Healthcare maintenance  -     Lipid Panel; Future  -     Comprehensive Metabolic Panel; Future  -     CBC (No Diff); Future  -     TSH Rfx On Abnormal To Free T4; Future  -     Hepatitis C antibody; Future    4. Bloating  -     Ambulatory Referral to Gastroenterology    5. Neuropathy  -     Comprehensive Metabolic Panel; Future  -     TSH Rfx On Abnormal To Free T4; Future  -     Vitamin B12; Future    6. Class 1 obesity due to excess calories without serious comorbidity with body mass index (BMI) of 30.0 to 30.9 in adult  Assessment & Plan:  Patient's (Body mass index is 30.43 kg/m².) indicates that they are obese (BMI >30) with health conditions that include impaired fasting glucose . Weight is newly identified. BMI  is above average; BMI management plan is completed. We discussed increasing exercise  Recommended glucose revolution book.       7. Varicose veins of both lower extremities without ulcer or inflammation    8. Chronic maxillary sinusitis    9.  "Small intestinal bacterial overgrowth (SIBO)        Assessment & Plan  Health maintenance:  - A1c is 5.5, no longer prediabetic  - Mood screening negative  - All cancer screenings up-to-date  - Advised to read \"Glucose Revolution\" and use the [employer] jordi for audiobooks  - Discussed potential benefits of CGM  - Encouraged relaxation strategies like yoga, meditation, and journaling  - Advised to maintain a healthy diet, spend time outdoors, and exercise regularly  - Suggested using the [employer] jordi for meditation  - Advised to monitor blood pressure twice weekly, aiming for readings below 130/80  - Encouraged to find a relaxing hobby  - Hepatitis C test ordered    Varicose veins:  - Scheduled for surgery on left leg on 01/06/2025 and right leg on 01/10/2025    Chronic sinusitis:  - Experiencing recurrent sinusitis and strep infections  - Recent culture identified staph infection  - Using nasal rinse with gentamicin  - Referred to an immunologist    Neuropathy  - Experiencing numbness in left foot and arms, particularly in the third and fourth toes  - Advised to monitor severity and occurrence  - B12 test ordered  - Consider EMG if numbness worsens    Hypertension:  - Blood pressure 138/74 today  - Advised to monitor blood pressure twice weekly, aiming for readings below 130/80  - Currently on losartan hydrochlorothiazide    Vitamin D deficiency:  - Vitamin D test ordered  - Taking a supplement with vitamin D, K, and omega-3    Bloating  History of colon polyps:  Hx of SIBO  - Colonoscopy last year identified a polyp  - Advised to follow up with a GI specialist   -Previously diagnosed with SIBO and on rifaximin    PROCEDURE  Underwent hysterectomy last year due to adenomyosis. Sinus surgery in 2018.        BP at goal < 130/80    Mood: PHQ 2 Negative    Patient or patient representative verbalized consent for the use of Ambient Listening during the visit with  Renita Li MD for chart documentation. " 12/23/2024  10:08 EST      Time:  min of face to face and non-face to face time on day of visit in collection of history, documentation, and .     Please note that portions of this document may have been completed with a voice recognition program.      At [facility], we believe that sharing information builds trust and better relationships. You are receiving this note because you are receiving care at [facility] or have recently visited. It is possible you will see health information before a provider has talked with you about it. This kind of information can be easy to misunderstand as it is a medical document. It is intended as neio-[id number] communication. It is written in medical language and may contain abbreviations or verbiage that are unfamiliar. It may appear blunt or direct. Medical documents are intended to carry relevant information, facts as evident, and the clinical opinion of the practitioner.  To help you fully understand what it means for your health, we urge you to discuss this note with your provider.

## 2024-12-23 NOTE — ASSESSMENT & PLAN NOTE
Patient's (Body mass index is 30.43 kg/m².) indicates that they are obese (BMI >30) with health conditions that include impaired fasting glucose . Weight is newly identified. BMI  is above average; BMI management plan is completed. We discussed increasing exercise  Recommended glucose revolution book.

## 2024-12-24 LAB
25(OH)D3 SERPL-MCNC: 28.4 NG/ML (ref 30–100)
ALBUMIN SERPL-MCNC: 4.3 G/DL (ref 3.5–5.2)
ALBUMIN/GLOB SERPL: 1.1 G/DL
ALP SERPL-CCNC: 130 U/L (ref 39–117)
ALT SERPL W P-5'-P-CCNC: 29 U/L (ref 1–33)
ANION GAP SERPL CALCULATED.3IONS-SCNC: 10 MMOL/L (ref 5–15)
AST SERPL-CCNC: 27 U/L (ref 1–32)
BILIRUB SERPL-MCNC: 0.7 MG/DL (ref 0–1.2)
BUN SERPL-MCNC: 15 MG/DL (ref 6–20)
BUN/CREAT SERPL: 16.3 (ref 7–25)
CALCIUM SPEC-SCNC: 9.3 MG/DL (ref 8.6–10.5)
CHLORIDE SERPL-SCNC: 101 MMOL/L (ref 98–107)
CHOLEST SERPL-MCNC: 165 MG/DL (ref 0–200)
CO2 SERPL-SCNC: 24 MMOL/L (ref 22–29)
CREAT SERPL-MCNC: 0.92 MG/DL (ref 0.57–1)
EGFRCR SERPLBLD CKD-EPI 2021: 76.5 ML/MIN/1.73
GLOBULIN UR ELPH-MCNC: 3.9 GM/DL
GLUCOSE SERPL-MCNC: 93 MG/DL (ref 65–99)
HCV AB SER QL: NORMAL
HDLC SERPL-MCNC: 47 MG/DL (ref 40–60)
LDLC SERPL CALC-MCNC: 100 MG/DL (ref 0–100)
LDLC/HDLC SERPL: 2.11 {RATIO}
POTASSIUM SERPL-SCNC: 3.8 MMOL/L (ref 3.5–5.2)
PROT SERPL-MCNC: 8.2 G/DL (ref 6–8.5)
SODIUM SERPL-SCNC: 135 MMOL/L (ref 136–145)
TRIGL SERPL-MCNC: 95 MG/DL (ref 0–150)
TSH SERPL DL<=0.05 MIU/L-ACNC: 1.16 UIU/ML (ref 0.27–4.2)
VIT B12 BLD-MCNC: 403 PG/ML (ref 211–946)
VLDLC SERPL-MCNC: 18 MG/DL (ref 5–40)

## 2025-01-28 ENCOUNTER — OFFICE VISIT (OUTPATIENT)
Dept: GASTROENTEROLOGY | Facility: CLINIC | Age: 50
End: 2025-01-28
Payer: COMMERCIAL

## 2025-01-28 VITALS — BODY MASS INDEX: 30.96 KG/M2 | HEIGHT: 69 IN | WEIGHT: 209 LBS

## 2025-01-28 DIAGNOSIS — R14.0 BLOATING: Primary | ICD-10-CM

## 2025-01-28 DIAGNOSIS — Z91.018 MULTIPLE FOOD ALLERGIES: ICD-10-CM

## 2025-01-28 DIAGNOSIS — K59.04 CHRONIC IDIOPATHIC CONSTIPATION: ICD-10-CM

## 2025-01-28 PROCEDURE — 99214 OFFICE O/P EST MOD 30 MIN: CPT | Performed by: INTERNAL MEDICINE

## 2025-01-28 RX ORDER — TRAMADOL HYDROCHLORIDE 50 MG/1
50 TABLET ORAL EVERY 6 HOURS PRN
COMMUNITY
Start: 2025-01-10

## 2025-01-28 NOTE — PROGRESS NOTES
PCP:  Renita Li MD Pistilli, Christina Katarina, MD  [address] [facility]  [address]  [place] [address]    Chief Complaint   Patient presents with    Follow-up     Follow up bloating        HPI    The patient is a 49-year-old known to me.  She is having some troubles with bloating and distention.  She also has troubles with constipation and may go several days without a bowel movement.  She then does some MiraLAX and it might take a day or so and that she has some urgency to go at that point which could be a problem.  She had a course of Xifaxan which did not appear to make a huge difference for her. she had a colonoscopy done on  12/27/2023.  She had a small adenoma removed.  She did get some allergy testing done.  She found that she is allergic to corn, wheat and also apples.  There may even be some other foods.  She has yet to start that diet.  She has tried the  FODMAP diet in the past.  She would like to see a dietitian.  She had a normal gastric emptying study 9/10/2024 but it was not a 4-hour study.    Allergies   Allergen Reactions    Aspirin Other (See Comments)     Pt has Von Willebrand disease     Ibuprofen Other (See Comments)     Pt has Von Willebrand disease           Current Outpatient Medications:     traMADol (ULTRAM) 50 MG tablet, Take 1 tablet by mouth Every 6 (Six) Hours As Needed. for pain, Disp: , Rfl:     betamethasone valerate (VALISONE) 0.1 % cream, Apply 1 Application topically to the appropriate area as directed 2 (Two) Times a Day., Disp: , Rfl:     estradiol (VIVELLE-DOT) 0.025 MG/24HR patch, Place 1 patch on the skin as directed by provider 2 (Two) Times a Week., Disp: , Rfl:     losartan-hydrochlorothiazide (HYZAAR) 100-25 MG per tablet, Take 1 tablet by mouth Daily., Disp: , Rfl:     Polyethylene Glycol 3350 (MIRALAX PO), , Disp: , Rfl:     Testosterone 20 % cream, Apply 0.5ml (2 clicks) topically & rub in once daily as directed., Disp: , Rfl:     tretinoin  (RETIN-A) 0.025 % cream, Apply 1 Application topically to the appropriate area as directed Every Night., Disp: , Rfl:     Yuvafem 10 MCG tablet vaginal tablet, Insert 1 tablet into the vagina 3 (Three) Times a Week., Disp: , Rfl:      Past Medical History:   Diagnosis Date    Anemia 10/2022    Bleeding disorder 1982    Von Willibrand's, Factor 8, Factor 12    Cervical dysplasia     Factor VIII (functional) deficiency     past few months at , tests have shown she no longer has this    Factor XII deficiency     past few months at , tests have shown she no longer has this    GERD (gastroesophageal reflux disease) 11/2023    History of abnormal uterine bleeding     History of vertigo     Hypertension     Iron deficiency anemia     Irritable bowel syndrome     VIC (obstructive sleep apnea)     mild, no cpap    Ovarian cyst     Pelvic prolapse     Prediabetes 06/2023    Shortness of breath on exertion     due to anemia    Vitamin D deficiency     Von Willebrand disease     diagnosed at age 6, past few months at , tests have shown she no longer has this       Past Surgical History:   Procedure Laterality Date    BREAST BIOPSY Right 2008    x 2    BREAST EXCISIONAL BIOPSY Right 2007    x 2    BREAST LUMPECTOMY Right 2008    BENIGN    COLONOSCOPY      COLPOSCOPY  2005    CYSTOSCOPY Bilateral 06/16/2023    Procedure: CYSTOSCOPY TEMPORARY PLACEMENT BILATERAL URETERAL CATHETER;  Surgeon: Emmanuel Mejía MD;  Location: [facility] OR;  Service: Urology;  Laterality: Bilateral;    ENDOSCOPIC FUNCTIONAL SINUS SURGERY (FESS) Bilateral 01/08/2019    Procedure: BILATERAL TOTAL ETHMOIDECTOMY,BILATERAL FRONTAL SINUPLASTY WITH TISSUE REMOVAL, BILATERAL MAXILLARY SINUPLASTY WITH TISSUE REMOVAL;  Surgeon: Jose Camacho MD;  Location:  [facility];  Service: ENT    FERTILITY SURGERY      1 round IUI with Akin    GYNECOLOGIC CRYOSURGERY  11/2005    INGUINAL HERNIA REPAIR  1981    LAPAROSCOPIC ASSISTED VAGINAL HYSTERECTOMY SALPINGO  OOPHORECTOMY  6/2023    POSTERIOR VAGINAL REPAIR N/A 06/16/2023    Procedure: POSTERIOR REPAIR, PERINEAL REPAIR;  Surgeon: Ana Carias MD;  Location: [facility] OR;  Service: Obstetrics/Gynecology;  Laterality: N/A;    SINUS SURGERY  2018    TOTAL LAPAROSCOPIC HYSTERECTOMY SALPINGECTOMY N/A 06/16/2023    Procedure: TOTAL LAPAROSCOPIC HYSTERECTOMY BILATERAL SALPINGECTOMY;  Surgeon: Ana Carias MD;  Location: [facility] OR;  Service: Obstetrics/Gynecology;  Laterality: N/A;    URETHERAL RE-IMPLANTATION  AGE 4    VAGINAL PROLAPSE REPAIR  6/2023    WISDOM TOOTH EXTRACTION          Social History     Socioeconomic History    Marital status:     Number of children: 2   Tobacco Use    Smoking status: Never     Passive exposure: Never    Smokeless tobacco: Never   Vaping Use    Vaping status: Never Used   Substance and Sexual Activity    Alcohol use: Not Currently     Comment: 0-1x monthly    Drug use: No    Sexual activity: Yes     Partners: Male     Birth control/protection: Other, Post-menopausal, Vasectomy        Family History   Problem Relation Age of Onset    Hypothyroidism Mother     Hyperlipidemia Mother     Stroke Mother 77    Hypertension Father     Lymphoma Father         non-Hodgkins    Other Father         Von Willebrands    Lung disease Maternal Grandmother     Breast cancer Neg Hx     Ovarian cancer Neg Hx     Colon cancer Neg Hx         Review of Systems     There were no vitals filed for this visit.     Physical Exam  Constitutional:       General: She is not in acute distress.     Appearance: Normal appearance. She is not ill-appearing.   Neurological:      Mental Status: She is alert.          Diagnoses and all orders for this visit:    1. Bloating (Primary).  We talked about the bloating at length.  Oftentimes it is either swallowed air or something you are eating.  We talked about pancreatic enzyme supplementation.  She like to give that a try.  I gave her some samples that  should last her at least a week.  She can take 1-2 with meals.  It is either going to help her it is not going to help.  She does not have diarrhea.  If it is not helping in a significant manner she will discontinue that and then start the diet that she talked about with the allergist.  We are going to set up a dietitian consult as well.  She is a little overwhelmed with cutting wheat out of her diet as well as corn.  I think the constipation could be playing a role as well and she may want to consider taking some MiraLAX on a more regular basis and titrating so that she does not have to have urgent bowel movements.    -     Ambulatory Referral to Nutrition Services    2. Multiple food allergies: We are to refer her to nutritional services dietitians for help with the food allergies identified  -     Ambulatory Referral to Nutrition Services    3. Chronic idiopathic constipation -she is can to consider trying the MiraLAX.  I suspect the chronic constipation may be contributing to her bloating as well.  We discussed that at length.       Chris Parrish MD

## 2025-01-28 NOTE — LETTER
January 28, 2025     Renita Li MD  [address] [address]    Patient: Lisa Martin   YOB: 1975   Date of Visit: 1/28/2025     Dear Renita Li MD:       Thank you for referring Lisa Martin to me for evaluation. Below are the relevant portions of my assessment and plan of care.    If you have questions, please do not hesitate to call me. I look forward to following Lisa along with you.         Sincerely,        Chris Parrish MD        CC: No Recipients    Chris Parrish MD  01/28/25 1633  Sign when Signing Visit  PCP:  Renita Li MD Pistilli, Christina Katarina, MD  [address] [place] [address]    Chief Complaint   Patient presents with   • Follow-up     Follow up bloating        HPI    The patient is a 49-year-old known to me.  She is having some troubles with bloating and distention.  She also has troubles with constipation and may go several days without a bowel movement.  She then does some MiraLAX and it might take a day or so and that she has some urgency to go at that point which could be a problem.  She had a course of Xifaxan which did not appear to make a huge difference for her. she had a colonoscopy done on  12/27/2023.  She had a small adenoma removed.  She did get some allergy testing done.  She found that she is allergic to corn, wheat and also apples.  There may even be some other foods.  She has yet to start that diet.  She has tried the  FODMAP diet in the past.  She would like to see a dietitian.  She had a normal gastric emptying study 9/10/2024 but it was not a 4-hour study.    Allergies   Allergen Reactions   • Aspirin Other (See Comments)     Pt has Von Willebrand disease    • Ibuprofen Other (See Comments)     Pt has Von Willebrand disease           Current Outpatient Medications:   •  traMADol (ULTRAM) 50 MG tablet, Take 1  tablet by mouth Every 6 (Six) Hours As Needed. for pain, Disp: , Rfl:   •  betamethasone valerate (VALISONE) 0.1 % cream, Apply 1 Application topically to the appropriate area as directed 2 (Two) Times a Day., Disp: , Rfl:   •  estradiol (VIVELLE-DOT) 0.025 MG/24HR patch, Place 1 patch on the skin as directed by provider 2 (Two) Times a Week., Disp: , Rfl:   •  losartan-hydrochlorothiazide (HYZAAR) 100-25 MG per tablet, Take 1 tablet by mouth Daily., Disp: , Rfl:   •  Polyethylene Glycol 3350 (MIRALAX PO), , Disp: , Rfl:   •  Testosterone 20 % cream, Apply 0.5ml (2 clicks) topically & rub in once daily as directed., Disp: , Rfl:   •  tretinoin (RETIN-A) 0.025 % cream, Apply 1 Application topically to the appropriate area as directed Every Night., Disp: , Rfl:   •  Yuvafem 10 MCG tablet vaginal tablet, Insert 1 tablet into the vagina 3 (Three) Times a Week., Disp: , Rfl:      Past Medical History:   Diagnosis Date   • Anemia 10/2022   • Bleeding disorder 1982    Von Willibrand's, Factor 8, Factor 12   • Cervical dysplasia    • Factor VIII (functional) deficiency     past few months at , tests have shown she no longer has this   • Factor XII deficiency     past few months at , tests have shown she no longer has this   • GERD (gastroesophageal reflux disease) 11/2023   • History of abnormal uterine bleeding    • History of vertigo    • Hypertension    • Iron deficiency anemia    • Irritable bowel syndrome    • VIC (obstructive sleep apnea)     mild, no cpap   • Ovarian cyst    • Pelvic prolapse    • Prediabetes 06/2023   • Shortness of breath on exertion     due to anemia   • Vitamin D deficiency    • Von Willebrand disease     diagnosed at age 6, past few months at , tests have shown she no longer has this       Past Surgical History:   Procedure Laterality Date   • BREAST BIOPSY Right 2008    x 2   • BREAST EXCISIONAL BIOPSY Right 2007    x 2   • BREAST LUMPECTOMY Right 2008    BENIGN   • COLONOSCOPY     •  COLPOSCOPY  2005   • CYSTOSCOPY Bilateral 06/16/2023    Procedure: CYSTOSCOPY TEMPORARY PLACEMENT BILATERAL URETERAL CATHETER;  Surgeon: Emmanuel Mejía MD;  Location:  [facility];  Service: Urology;  Laterality: Bilateral;   • ENDOSCOPIC FUNCTIONAL SINUS SURGERY (FESS) Bilateral 01/08/2019    Procedure: BILATERAL TOTAL ETHMOIDECTOMY,BILATERAL FRONTAL SINUPLASTY WITH TISSUE REMOVAL, BILATERAL MAXILLARY SINUPLASTY WITH TISSUE REMOVAL;  Surgeon: Jose Camacho MD;  Location:  [facility];  Service: ENT   • FERTILITY SURGERY      1 round IUI with Jules   • GYNECOLOGIC CRYOSURGERY  11/2005   • INGUINAL HERNIA REPAIR  1981   • LAPAROSCOPIC ASSISTED VAGINAL HYSTERECTOMY SALPINGO OOPHORECTOMY  6/2023   • POSTERIOR VAGINAL REPAIR N/A 06/16/2023    Procedure: POSTERIOR REPAIR, PERINEAL REPAIR;  Surgeon: Ana Carias MD;  Location:  [facility];  Service: Obstetrics/Gynecology;  Laterality: N/A;   • SINUS SURGERY  2018   • TOTAL LAPAROSCOPIC HYSTERECTOMY SALPINGECTOMY N/A 06/16/2023    Procedure: TOTAL LAPAROSCOPIC HYSTERECTOMY BILATERAL SALPINGECTOMY;  Surgeon: Ana Carias MD;  Location:  [facility];  Service: Obstetrics/Gynecology;  Laterality: N/A;   • URETHERAL RE-IMPLANTATION  AGE 4   • VAGINAL PROLAPSE REPAIR  6/2023   • WISDOM TOOTH EXTRACTION          Social History     Socioeconomic History   • Marital status:    • Number of children: 2   Tobacco Use   • Smoking status: Never     Passive exposure: Never   • Smokeless tobacco: Never   Vaping Use   • Vaping status: Never Used   Substance and Sexual Activity   • Alcohol use: Not Currently     Comment: 0-1x monthly   • Drug use: No   • Sexual activity: Yes     Partners: Male     Birth control/protection: Other, Post-menopausal, Vasectomy        Family History   Problem Relation Age of Onset   • Hypothyroidism Mother    • Hyperlipidemia Mother    • Stroke Mother 77   • Hypertension Father    • Lymphoma Father         non-Hodgkins   • Other  Father         Von Willebrands   • Lung disease Maternal Grandmother    • Breast cancer Neg Hx    • Ovarian cancer Neg Hx    • Colon cancer Neg Hx         Review of Systems     There were no vitals filed for this visit.     Physical Exam  Constitutional:       General: She is not in acute distress.     Appearance: Normal appearance. She is not ill-appearing.   Neurological:      Mental Status: She is alert.          Diagnoses and all orders for this visit:    1. Bloating (Primary).  We talked about the bloating at length.  Oftentimes it is either swallowed air or something you are eating.  We talked about pancreatic enzyme supplementation.  She like to give that a try.  I gave her some samples that should last her at least a week.  She can take 1-2 with meals.  It is either going to help her it is not going to help.  She does not have diarrhea.  If it is not helping in a significant manner she will discontinue that and then start the diet that she talked about with the allergist.  We are going to set up a dietitian consult as well.  She is a little overwhelmed with cutting wheat out of her diet as well as corn.  I think the constipation could be playing a role as well and she may want to consider taking some MiraLAX on a more regular basis and titrating so that she does not have to have urgent bowel movements.    -     Ambulatory Referral to Nutrition Services    2. Multiple food allergies: We are to refer her to nutritional services dietitians for help with the food allergies identified  -     Ambulatory Referral to Nutrition Services    3. Chronic idiopathic constipation -she is can to consider trying the MiraLAX.  I suspect the chronic constipation may be contributing to her bloating as well.  We discussed that at length.       Chris Parrish MD

## 2025-01-31 ENCOUNTER — HOSPITAL ENCOUNTER (OUTPATIENT)
Dept: NUTRITION | Facility: HOSPITAL | Age: 50
Setting detail: RECURRING SERIES
Discharge: HOME | End: 2025-01-31

## 2025-01-31 PROCEDURE — 97802 MEDICAL NUTRITION INDIV IN: CPT

## 2025-01-31 NOTE — CONSULTS
[facility] Nutrition Services          Initial 60 Minute Nutrition Visit    Date: 2025   Patient Name: Lisa Martin  : 1975   MRN: [id number]   Referring Provider: Chris Parrish MD    Reason for Visit: bloating, food allergies  Visit Format: In person    Nutrition Assessment       Social History:   Social History     Socioeconomic History    Marital status:     Number of children: 2   Tobacco Use    Smoking status: Never     Passive exposure: Never    Smokeless tobacco: Never   Vaping Use    Vaping status: Never Used   Substance and Sexual Activity    Alcohol use: Not Currently     Comment: 0-1x monthly    Drug use: No    Sexual activity: Yes     Partners: Male     Birth control/protection: Other, Post-menopausal, Vasectomy     Active Problem List:   Patient Active Problem List    Diagnosis     Genitourinary syndrome of menopause [N95.8]     Hypoactive sexual desire disorder [F52.0]     Essential hypertension [I10]     H/O: hysterectomy [Z90.710]     Class 1 obesity due to excess calories without serious comorbidity with body mass index (BMI) of 30.0 to 30.9 in adult [E66.811, E66.09, Z68.30]     Varicose veins of both lower extremities without ulcer or inflammation [I83.93]     Neuropathy [G62.9]     Chronic maxillary sinusitis [J32.0]     Small intestinal bacterial overgrowth (SIBO) [K63.8219]     Vitamin D deficiency [E55.9]     Prediabetes [R73.03]     Sebaceous cyst [L72.3]       Current Medications:   Current Outpatient Medications:     betamethasone valerate (VALISONE) 0.1 % cream, Apply 1 Application topically to the appropriate area as directed 2 (Two) Times a Day., Disp: , Rfl:     estradiol (VIVELLE-DOT) 0.025 MG/24HR patch, Place 1 patch on the skin as directed by provider 2 (Two) Times a Week., Disp: , Rfl:     losartan-hydrochlorothiazide (HYZAAR) 100-25 MG per tablet, Take 1 tablet by mouth Daily., Disp: , Rfl:     Polyethylene Glycol 3350  (MIRALAX PO), , Disp: , Rfl:     Testosterone 20 % cream, Apply 0.5ml (2 clicks) topically & rub in once daily as directed., Disp: , Rfl:     traMADol (ULTRAM) 50 MG tablet, Take 1 tablet by mouth Every 6 (Six) Hours As Needed. for pain, Disp: , Rfl:     tretinoin (RETIN-A) 0.025 % cream, Apply 1 Application topically to the appropriate area as directed Every Night., Disp: , Rfl:     Yuvafem 10 MCG tablet vaginal tablet, Insert 1 tablet into the vagina 3 (Three) Times a Week., Disp: , Rfl:     Recent Pertinent Labs: vit D 28.4, HgbA1c 5.5    Hunger Vital Sign Food Insecurity Assessment:  Within the past 12 months I/we worried whether our food would run out before I/we got money to buy more: No   Within the past 12 months the food I/we bought just didn't last and I/we didn't have money to get more: No   Use of food assistance programs (WIC, food stamps, food [place]) No       Food & Nutrition Related History       Food Allergies: unknown food allergies. Testing indicated beef, fish, string bean, pecan, cranberry, scallops, black pepper, cinnamon, nutmeg, oranges, green peppers, corn, wheat, oats, apples.  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  Who prepares most meals: patient  Who does grocery shopping: patient  How many meals are purchased from fast food/sit down restaurants per week: varies  Difficulty chewin - Normal  Difficulty swallowin - Normal  Diet requirement related to personal preference or cultural belief: None  History of eating disorder/disordered eating habits: None  Language/communication details: English  Barriers to learning: None    24 Hour Recall: see pre-appt questionnaire for details  Time Food/beverages consumed                                     Additional comments: Patient's  "diet contains a number of potential FODMAP triggers    Anthropometrics      Height:   Ht Readings from Last 1 Encounters:   01/28/25 175.5 cm (69.09\")     Weight:   Wt Readings from Last 3 Encounters:   01/28/25 94.8 kg (209 lb)   12/23/24 93.7 kg (206 lb 9.6 oz)   06/03/24 90.7 kg (200 lb)     BMI: There is no height or weight on file to calculate BMI.   Weight Change: None     Physical Activity     Barriers to physical activity: N/A     Physical activity comments: N/A     Estimated Needs     Estimated Energy Needs: N/A    Estimated Protein Needs: N/A     Estimated Fluid Needs: N/A     Discussion / Education      Lisa Martin is a 49 y.o. female who has been referred for bloating and food allergies. Her primary motivation is to improve confidence, reduce risk of chronic disease/long term complications, improve energy levels, and improve symptoms. Her primary barriers to change are food intolerances. She lives with family. Cooking and grocery shopping are done by patient. She dines out 2-3 times per week. She has not previously seen a dietitian/nutritionist.     Patient was identified as potentially benefiting from low FODMAP diet due to GI concerns. Patient's current GI symptoms are bloating and constipation. Patient was told that she has a number of food allergies indicated by skin prick testing, but she is uncertain as to positive/false positive results. Testing indicated beef, fish, string bean, pecan, cranberry, scallops, black pepper, cinnamon, nutmeg, oranges, green peppers, corn, wheat, oats, apples. Patient has attempted a low FODMAP diet before, but not guided by a healthcare professional. She has been experiencing symptoms since childhood, but significantly worsened post-COVID era. RD reviewed low FODMAP diet guidelines with patient and explained 3 part low FODMAP diet: elimination, reintroduction, and maintenance. The elimination phase consists of 1-3 weeks of no/low FODMAP foods only, " followed by 4-12 week reintroduction phase, eventually leading to long term maintenance phase. RD reviewed low FODMAP sources of fiber with patient. RD also encouraged patient to prioritize adequate hydration to help general GI symptoms. RD asked patient to identify foods from high FODMAP list that she feels are known trigger foods for her. She identified potential triggers as allergies listed previously. RD encouraged patient to maintain a food and symptom log for the duration of the elimination phase and reintroduction of FODMAP containing foods and to bring it to her next appointment to review with RD. She was agreeable to this. RD also provided FODMAP Everyday website for additional resources and recipes.     RD and patient worked to set several goals for patient. RD provided contact info and encouraged patient to reach out with any additional questions or concerns following the appointment.    Assessment of patient engagement: Engaged    Measurement of understanding: Patient verbalized understanding, Patient able to demonstrate understanding with teach back     Resources Provided:  [facility] FODMAP education  FODMAP foods list    Goal (s)      Goal 1: Food Journal through FU   Goal 2: Follow low FODMAP diet      Plan of Care     PES Statement:   Food causing negative symptoms related to IBS as evidenced by gastrointestinal symptoms.     Follow Up Visit      Follow Up not scheduled at this time. Patient to call and schedule    Total of 60 minutes spent with patient on nutrition counseling. Education based on Academy of Nutrition and Dietetics guidelines. Patient was provided with RD's contact information. Thank you for this referral.

## 2025-03-26 ENCOUNTER — LAB (OUTPATIENT)
Age: 50
End: 2025-03-26
Payer: COMMERCIAL

## 2025-03-26 DIAGNOSIS — R74.8 ELEVATED ALKALINE PHOSPHATASE LEVEL: ICD-10-CM

## 2025-03-26 PROCEDURE — 36415 COLL VENOUS BLD VENIPUNCTURE: CPT | Performed by: STUDENT IN AN ORGANIZED HEALTH CARE EDUCATION/TRAINING PROGRAM

## 2025-03-26 PROCEDURE — 82977 ASSAY OF GGT: CPT | Performed by: STUDENT IN AN ORGANIZED HEALTH CARE EDUCATION/TRAINING PROGRAM

## 2025-03-26 PROCEDURE — 80053 COMPREHEN METABOLIC PANEL: CPT | Performed by: STUDENT IN AN ORGANIZED HEALTH CARE EDUCATION/TRAINING PROGRAM

## 2025-03-27 LAB
ALBUMIN SERPL-MCNC: 4.1 G/DL (ref 3.5–5.2)
ALBUMIN/GLOB SERPL: 1.1 G/DL
ALP SERPL-CCNC: 120 U/L (ref 39–117)
ALT SERPL W P-5'-P-CCNC: 23 U/L (ref 1–33)
ANION GAP SERPL CALCULATED.3IONS-SCNC: 12 MMOL/L (ref 5–15)
AST SERPL-CCNC: 21 U/L (ref 1–32)
BILIRUB SERPL-MCNC: 0.5 MG/DL (ref 0–1.2)
BUN SERPL-MCNC: 14 MG/DL (ref 6–20)
BUN/CREAT SERPL: 16.9 (ref 7–25)
CALCIUM SPEC-SCNC: 9.2 MG/DL (ref 8.6–10.5)
CHLORIDE SERPL-SCNC: 102 MMOL/L (ref 98–107)
CO2 SERPL-SCNC: 26 MMOL/L (ref 22–29)
CREAT SERPL-MCNC: 0.83 MG/DL (ref 0.57–1)
EGFRCR SERPLBLD CKD-EPI 2021: 86.5 ML/MIN/1.73
GGT SERPL-CCNC: 14 U/L (ref 5–36)
GLOBULIN UR ELPH-MCNC: 3.7 GM/DL
GLUCOSE SERPL-MCNC: 88 MG/DL (ref 65–99)
POTASSIUM SERPL-SCNC: 3.9 MMOL/L (ref 3.5–5.2)
PROT SERPL-MCNC: 7.8 G/DL (ref 6–8.5)
SODIUM SERPL-SCNC: 140 MMOL/L (ref 136–145)

## 2025-04-30 ENCOUNTER — HOSPITAL ENCOUNTER (OUTPATIENT)
Facility: HOSPITAL | Age: 50
Discharge: HOME | End: 2025-04-30
Admitting: STUDENT IN AN ORGANIZED HEALTH CARE EDUCATION/TRAINING PROGRAM
Payer: COMMERCIAL

## 2025-04-30 DIAGNOSIS — R74.8 ELEVATED ALKALINE PHOSPHATASE LEVEL: ICD-10-CM

## 2025-04-30 PROCEDURE — 76705 ECHO EXAM OF ABDOMEN: CPT

## 2025-07-23 ENCOUNTER — PATIENT ROUNDING (BHMG ONLY) (OUTPATIENT)
Dept: URGENT CARE | Facility: CLINIC | Age: 50
End: 2025-07-23
Payer: COMMERCIAL

## 2025-07-23 NOTE — ED NOTES
Thank you for letting us care for you in your recent visit to our urgent care center. We would love to hear about your experience with us. Was this the first time you have visited our location?    We’re always looking for ways to make our patients’ experiences even better. Do you have any recommendations on ways we may improve?     I appreciate you taking the time to respond. Please be on the lookout for a survey about your recent visit from [employer] via text or email. We would greatly appreciate if you could fill that out and turn it back in. We want your voice to be heard and we value your feedback.   Thank you for choosing [facility] for your healthcare needs.

## 2025-08-26 ENCOUNTER — OFFICE VISIT (OUTPATIENT)
Dept: GASTROENTEROLOGY | Facility: CLINIC | Age: 50
End: 2025-08-26
Payer: COMMERCIAL

## 2025-08-26 VITALS — BODY MASS INDEX: 30.66 KG/M2 | HEIGHT: 69 IN | WEIGHT: 207 LBS

## 2025-08-26 DIAGNOSIS — K59.04 CHRONIC IDIOPATHIC CONSTIPATION: ICD-10-CM

## 2025-08-26 DIAGNOSIS — R10.13 EPIGASTRIC PAIN: ICD-10-CM

## 2025-08-26 DIAGNOSIS — R14.0 BLOATING: Primary | ICD-10-CM

## 2025-08-26 PROCEDURE — 99214 OFFICE O/P EST MOD 30 MIN: CPT | Performed by: INTERNAL MEDICINE

## 2025-08-26 RX ORDER — ESTRADIOL 0.07 MG/D
1 FILM, EXTENDED RELEASE TRANSDERMAL 2 TIMES WEEKLY
COMMUNITY
Start: 2025-07-14

## 2025-08-26 RX ORDER — CALCIUM CARBONATE/VITAMIN D3 600 MG-10
TABLET ORAL
COMMUNITY
Start: 2024-07-12

## (undated) DEVICE — GLV SURG SENSICARE PI MIC PF SZ7 LF STRL

## (undated) DEVICE — DRSNG WND GZ PAD BORDERED 4X8IN STRL

## (undated) DEVICE — COL CULT SYS

## (undated) DEVICE — SURGUARD3 SAFETY HYPODERMIC NEEDLE: Brand: SURGUARD3

## (undated) DEVICE — NASAL EPISTAXIS 2 PACK: Brand: XEROGEL

## (undated) DEVICE — INTENDED FOR TISSUE SEPARATION, AND OTHER PROCEDURES THAT REQUIRE A SHARP SURGICAL BLADE TO PUNCTURE OR CUT.: Brand: BARD-PARKER ® STAINLESS STEEL BLADES

## (undated) DEVICE — TUBING, SUCTION, 1/4" X 10', STRAIGHT: Brand: MEDLINE

## (undated) DEVICE — ENDOPATH XCEL BLADELESS TROCARS WITH STABILITY SLEEVES: Brand: ENDOPATH XCEL

## (undated) DEVICE — COVER,LIGHT HANDLE,FLX,1/PK: Brand: MEDLINE INDUSTRIES, INC.

## (undated) DEVICE — ENDOCUT SCISSOR TIP, DISPOSABLE: Brand: RENEW

## (undated) DEVICE — APPL CHLORAPREP TINTED 26ML TEAL

## (undated) DEVICE — PK MAJ ENT 10

## (undated) DEVICE — PK LAP HYST 10

## (undated) DEVICE — DECANTER BAG 9": Brand: MEDLINE INDUSTRIES, INC.

## (undated) DEVICE — ANTIBACTERIAL UNDYED BRAIDED (POLYGLACTIN 910), SYNTHETIC ABSORBABLE SUTURE: Brand: COATED VICRYL

## (undated) DEVICE — RETR RNG GEN2 31.8X18.3CM

## (undated) DEVICE — SLENDER TIP INSTRUMENT: Brand: VITAL VUE

## (undated) DEVICE — [HIGH FLOW INSUFFLATOR,  DO NOT USE IF PACKAGE IS DAMAGED,  KEEP DRY,  KEEP AWAY FROM SUNLIGHT,  PROTECT FROM HEAT AND RADIOACTIVE SOURCES.]: Brand: PNEUMOSURE

## (undated) DEVICE — GLV SURG SENSICARE PI MIC PF SZ6.5 LF STRL

## (undated) DEVICE — SYR LUERLOK 50ML

## (undated) DEVICE — MANIP UTER ELEVATOR/PRO W/OCCLUDORBALLOON/BALN 40MM XLG STRL

## (undated) DEVICE — NEURO SPONGES: Brand: DEROYAL

## (undated) DEVICE — APPL HEMOS FOR DELIVERY FLOSEAL

## (undated) DEVICE — GLV SURG GRN DERMASSURE LF PF 7.5

## (undated) DEVICE — SAFESECURE,SECUREMENT,FOLEY CATH,STERILE: Brand: MEDLINE

## (undated) DEVICE — EYE PAD,OVAL: Brand: CURITY

## (undated) DEVICE — DEFOGGER!" ANTI FOG KIT: Brand: DEROYAL

## (undated) DEVICE — RETR STAY HK ELAS SHRP 5MM 50PK

## (undated) DEVICE — SCRB SURG BACTOSHIELD CHG 4PCT 4OZ

## (undated) DEVICE — LAPAROSCOPIC SMOKE FILTRATION SYSTEM: Brand: PALL LAPAROSHIELD® PLUS LAPAROSCOPIC SMOKE FILTRATION SYSTEM

## (undated) DEVICE — SUT VICYL 2/0 CR8 18IN DYED J726D

## (undated) DEVICE — SUT PROLN 2/0 KS 30IN 8623H

## (undated) DEVICE — GLV SURG SENSICARE MICRO PF LF 7.5 STRL

## (undated) DEVICE — CULT ANAERB

## (undated) DEVICE — TRENDELENBURG WINGPAD POSITIONING KIT DELUXE - WITHOUT BODY STRAP: Brand: SOULE MEDICAL

## (undated) DEVICE — SUT VIC MO 0/0 18IN D6043

## (undated) DEVICE — LEX VAGINAL HYSTERECTOMY: Brand: MEDLINE INDUSTRIES, INC.